# Patient Record
Sex: MALE | Race: OTHER | Employment: FULL TIME | URBAN - METROPOLITAN AREA
[De-identification: names, ages, dates, MRNs, and addresses within clinical notes are randomized per-mention and may not be internally consistent; named-entity substitution may affect disease eponyms.]

---

## 2021-08-14 ENCOUNTER — HOSPITAL ENCOUNTER (EMERGENCY)
Age: 56
Discharge: HOME | End: 2021-08-14
Attending: EMERGENCY MEDICINE
Payer: COMMERCIAL

## 2021-08-14 ENCOUNTER — APPOINTMENT (OUTPATIENT)
Dept: CT IMAGING | Age: 56
End: 2021-08-14
Attending: EMERGENCY MEDICINE
Payer: COMMERCIAL

## 2021-08-14 VITALS
DIASTOLIC BLOOD PRESSURE: 66 MMHG | HEIGHT: 70 IN | BODY MASS INDEX: 34.36 KG/M2 | SYSTOLIC BLOOD PRESSURE: 154 MMHG | HEART RATE: 67 BPM | RESPIRATION RATE: 16 BRPM | WEIGHT: 240 LBS | TEMPERATURE: 99 F | OXYGEN SATURATION: 97 %

## 2021-08-14 DIAGNOSIS — S06.0X0A CONCUSSION WITHOUT LOSS OF CONSCIOUSNESS, INITIAL ENCOUNTER: ICD-10-CM

## 2021-08-14 DIAGNOSIS — S16.1XXA STRAIN OF NECK MUSCLE, INITIAL ENCOUNTER: ICD-10-CM

## 2021-08-14 DIAGNOSIS — S09.90XA INJURY OF HEAD, INITIAL ENCOUNTER: Primary | ICD-10-CM

## 2021-08-14 LAB — GLUCOSE BLD-MCNC: 240 MG/DL (ref 70–99)

## 2021-08-14 PROCEDURE — 99284 EMERGENCY DEPT VISIT MOD MDM: CPT

## 2021-08-14 PROCEDURE — 70450 CT HEAD/BRAIN W/O DYE: CPT | Performed by: EMERGENCY MEDICINE

## 2021-08-14 PROCEDURE — 82962 GLUCOSE BLOOD TEST: CPT

## 2021-08-14 RX ORDER — ONDANSETRON 4 MG/1
4 TABLET, ORALLY DISINTEGRATING ORAL EVERY 4 HOURS PRN
Qty: 10 TABLET | Refills: 0 | Status: SHIPPED | OUTPATIENT
Start: 2021-08-14 | End: 2021-11-12 | Stop reason: CLARIF

## 2021-08-14 RX ORDER — ONDANSETRON 4 MG/1
4 TABLET, ORALLY DISINTEGRATING ORAL ONCE
Status: COMPLETED | OUTPATIENT
Start: 2021-08-14 | End: 2021-08-14

## 2021-08-14 RX ORDER — CYCLOBENZAPRINE HCL 10 MG
10 TABLET ORAL 3 TIMES DAILY PRN
Qty: 20 TABLET | Refills: 0 | Status: SHIPPED | OUTPATIENT
Start: 2021-08-14 | End: 2021-08-21

## 2021-08-14 RX ORDER — NAPROXEN 500 MG/1
500 TABLET ORAL 2 TIMES DAILY PRN
Qty: 20 TABLET | Refills: 0 | Status: SHIPPED | OUTPATIENT
Start: 2021-08-14 | End: 2021-08-21

## 2021-08-15 NOTE — ED PROVIDER NOTES
Patient Seen in: [facility] Emergency Department In [place]      History   Patient presents with:  Trauma    Stated Complaint: hit with golf ball    HPI/Subjective:   HPI    58-year-old male who was struck in the head with a golf ball at approximately 10 and are negative. Positive for stated complaint: hit with golf ball  Other systems are as noted in HPI. Constitutional and vital signs reviewed. All other systems reviewed and negative except as noted above.     Physical Exam     ED Triage Vitals other components within normal limits          CT BRAIN OR HEAD (79828)    Result Date: 8/14/2021  PROCEDURE:  CT BRAIN OR HEAD (26094)  COMPARISON:  None.   INDICATIONS:  hit with golf ball  TECHNIQUE:  Noncontrast CT scanning is performed through the brai concussion.   We discussed care and treatment and he was discharged in good condition                             Disposition and Plan     Clinical Impression:  Injury of head, initial encounter  (primary encounter diagnosis)  Concussion without loss of con

## 2021-08-17 ENCOUNTER — PATIENT OUTREACH (OUTPATIENT)
Dept: CASE MANAGEMENT | Age: 56
End: 2021-08-17

## 2021-08-17 NOTE — PROGRESS NOTES
Patient LVM requesting assistance scheduling apt    [address] [facility], [address] [place] [address] Rd  [phone]    Returned patients VM and he stated he has apt scheduled. Closing encounter.

## 2021-08-27 ENCOUNTER — OFFICE VISIT (OUTPATIENT)
Dept: NEUROLOGY | Facility: CLINIC | Age: 56
End: 2021-08-27
Payer: COMMERCIAL

## 2021-08-27 VITALS
RESPIRATION RATE: 16 BRPM | SYSTOLIC BLOOD PRESSURE: 132 MMHG | DIASTOLIC BLOOD PRESSURE: 70 MMHG | BODY MASS INDEX: 36 KG/M2 | HEART RATE: 74 BPM | WEIGHT: 249.19 LBS

## 2021-08-27 DIAGNOSIS — S09.90XA CLOSED HEAD INJURY, INITIAL ENCOUNTER: ICD-10-CM

## 2021-08-27 DIAGNOSIS — S16.1XXA STRAIN OF NECK MUSCLE, INITIAL ENCOUNTER: ICD-10-CM

## 2021-08-27 PROCEDURE — 99204 OFFICE O/P NEW MOD 45 MIN: CPT | Performed by: OTHER

## 2021-08-27 PROCEDURE — 3078F DIAST BP <80 MM HG: CPT | Performed by: OTHER

## 2021-08-27 PROCEDURE — 3075F SYST BP GE 130 - 139MM HG: CPT | Performed by: OTHER

## 2021-08-27 NOTE — PROGRESS NOTES
Patient reports was hit back of head with golf ball. No LOC. Patient reports no symptoms at this time.

## 2021-08-27 NOTE — PROGRESS NOTES
HPI:    Patient ID: Panda Brown is a 61year old male. PCP: Dr Aldo Rangel    HPI   Mr Pretty Lunsford is a 58-year-old male who presented for for evaluation of closed head injury.  He reports about 2 weeks ago he was playing golf and was struck in the head wi Never Used      Tobacco comment: once a week    Vaping Use      Vaping Use: Never used    Alcohol use: Yes      Comment: socially    Drug use: No       Review of Systems         Current Outpatient Medications   Medication Sig Dispense Refill   • metFORMIN Palate: intact  XI: Shoulder shrug: intact  XII: Tongue movement: normal    Motor Exam: Normal tone. Strength is  5 out of 5 in all extremities bilaterally.   DTR: 1+ in arms, 2+ patellar and 1+ achilles    Sensory: Sensory examination is normal to light to

## 2021-11-07 ENCOUNTER — ANESTHESIA EVENT (OUTPATIENT)
Dept: ENDOSCOPY | Facility: HOSPITAL | Age: 56
DRG: 432 | End: 2021-11-07
Payer: COMMERCIAL

## 2021-11-07 ENCOUNTER — HOSPITAL ENCOUNTER (INPATIENT)
Facility: HOSPITAL | Age: 56
LOS: 3 days | Discharge: HOME | DRG: 432 | End: 2021-11-10
Attending: EMERGENCY MEDICINE | Admitting: HOSPITALIST
Payer: COMMERCIAL

## 2021-11-07 ENCOUNTER — APPOINTMENT (OUTPATIENT)
Dept: CT IMAGING | Age: 56
DRG: 432 | End: 2021-11-07
Attending: EMERGENCY MEDICINE
Payer: COMMERCIAL

## 2021-11-07 ENCOUNTER — ANESTHESIA (OUTPATIENT)
Dept: ENDOSCOPY | Facility: HOSPITAL | Age: 56
DRG: 432 | End: 2021-11-07
Payer: COMMERCIAL

## 2021-11-07 DIAGNOSIS — K92.2 GASTROINTESTINAL HEMORRHAGE, UNSPECIFIED GASTROINTESTINAL HEMORRHAGE TYPE: Primary | ICD-10-CM

## 2021-11-07 PROCEDURE — 99223 1ST HOSP IP/OBS HIGH 75: CPT | Performed by: HOSPITALIST

## 2021-11-07 PROCEDURE — 74177 CT ABD & PELVIS W/CONTRAST: CPT | Performed by: EMERGENCY MEDICINE

## 2021-11-07 PROCEDURE — 06L38CZ OCCLUSION OF ESOPHAGEAL VEIN WITH EXTRALUMINAL DEVICE, VIA NATURAL OR ARTIFICIAL OPENING ENDOSCOPIC: ICD-10-PCS | Performed by: INTERNAL MEDICINE

## 2021-11-07 RX ORDER — ESOMEPRAZOLE MAGNESIUM 40 MG/1
40 CAPSULE, DELAYED RELEASE ORAL DAILY
COMMUNITY
Start: 2021-04-06

## 2021-11-07 RX ORDER — HYDROMORPHONE HYDROCHLORIDE 1 MG/ML
0.4 INJECTION, SOLUTION INTRAMUSCULAR; INTRAVENOUS; SUBCUTANEOUS EVERY 4 HOURS PRN
Status: DISCONTINUED | OUTPATIENT
Start: 2021-11-07 | End: 2021-11-10

## 2021-11-07 RX ORDER — HYDROMORPHONE HYDROCHLORIDE 1 MG/ML
0.2 INJECTION, SOLUTION INTRAMUSCULAR; INTRAVENOUS; SUBCUTANEOUS EVERY 4 HOURS PRN
Status: DISCONTINUED | OUTPATIENT
Start: 2021-11-07 | End: 2021-11-10

## 2021-11-07 RX ORDER — ONDANSETRON 2 MG/ML
4 INJECTION INTRAMUSCULAR; INTRAVENOUS EVERY 4 HOURS PRN
Status: DISCONTINUED | OUTPATIENT
Start: 2021-11-07 | End: 2021-11-07

## 2021-11-07 RX ORDER — ONDANSETRON 2 MG/ML
4 INJECTION INTRAMUSCULAR; INTRAVENOUS EVERY 6 HOURS PRN
Status: DISCONTINUED | OUTPATIENT
Start: 2021-11-07 | End: 2021-11-10

## 2021-11-07 RX ORDER — ACETAMINOPHEN AND CODEINE PHOSPHATE 300; 30 MG/1; MG/1
44198 TABLET ORAL EVERY 6 HOURS PRN
Status: ON HOLD | COMMUNITY
Start: 2021-03-29 | End: 2021-11-10

## 2021-11-07 RX ORDER — DEXTROSE MONOHYDRATE 25 G/50ML
50 INJECTION, SOLUTION INTRAVENOUS
Status: DISCONTINUED | OUTPATIENT
Start: 2021-11-07 | End: 2021-11-10

## 2021-11-07 RX ORDER — FUROSEMIDE 40 MG/1
60 TABLET ORAL DAILY
COMMUNITY
Start: 2021-09-20

## 2021-11-07 RX ORDER — SPIRONOLACTONE 100 MG/1
150 TABLET, FILM COATED ORAL DAILY
COMMUNITY
Start: 2021-09-20

## 2021-11-07 RX ORDER — LIDOCAINE HYDROCHLORIDE 10 MG/ML
INJECTION, SOLUTION EPIDURAL; INFILTRATION; INTRACAUDAL; PERINEURAL AS NEEDED
Status: DISCONTINUED | OUTPATIENT
Start: 2021-11-07 | End: 2021-11-07 | Stop reason: SURG

## 2021-11-07 RX ORDER — LORAZEPAM 2 MG/ML
1 INJECTION INTRAMUSCULAR ONCE
Status: COMPLETED | OUTPATIENT
Start: 2021-11-07 | End: 2021-11-07

## 2021-11-07 RX ORDER — SODIUM CHLORIDE 9 MG/ML
INJECTION, SOLUTION INTRAVENOUS CONTINUOUS
Status: DISCONTINUED | OUTPATIENT
Start: 2021-11-07 | End: 2021-11-07

## 2021-11-07 RX ORDER — KETAMINE HYDROCHLORIDE 50 MG/ML
INJECTION, SOLUTION, CONCENTRATE INTRAMUSCULAR; INTRAVENOUS AS NEEDED
Status: DISCONTINUED | OUTPATIENT
Start: 2021-11-07 | End: 2021-11-07 | Stop reason: SURG

## 2021-11-07 RX ORDER — PROCHLORPERAZINE EDISYLATE 5 MG/ML
5 INJECTION INTRAMUSCULAR; INTRAVENOUS EVERY 8 HOURS PRN
Status: DISCONTINUED | OUTPATIENT
Start: 2021-11-07 | End: 2021-11-10

## 2021-11-07 RX ORDER — ROSUVASTATIN CALCIUM 20 MG/1
20 TABLET, COATED ORAL DAILY
COMMUNITY
Start: 2021-09-08

## 2021-11-07 RX ORDER — SODIUM CHLORIDE 9 MG/ML
125 INJECTION, SOLUTION INTRAVENOUS CONTINUOUS
Status: DISCONTINUED | OUTPATIENT
Start: 2021-11-07 | End: 2021-11-08

## 2021-11-07 RX ORDER — NADOLOL 20 MG/1
20 TABLET ORAL DAILY
COMMUNITY
Start: 2021-06-26

## 2021-11-07 RX ADMIN — SODIUM CHLORIDE: 9 INJECTION, SOLUTION INTRAVENOUS at 10:13:00

## 2021-11-07 RX ADMIN — KETAMINE HYDROCHLORIDE 20 MG: 50 INJECTION, SOLUTION, CONCENTRATE INTRAMUSCULAR; INTRAVENOUS at 10:16:00

## 2021-11-07 RX ADMIN — LIDOCAINE HYDROCHLORIDE 50 MG: 10 INJECTION, SOLUTION EPIDURAL; INFILTRATION; INTRACAUDAL; PERINEURAL at 10:16:00

## 2021-11-07 NOTE — ED INITIAL ASSESSMENT (HPI)
PT to the ED for evaluation of LUQ abdominal pain and nausea. PT reports blood in his stool this morning and states he was recently diagnosed with cirrhosis.

## 2021-11-07 NOTE — H&P
[facility]PITAL[facility]  History and Physical     Dot Adarsh Patient Status:  Inpatient    1965 MRN [id number]   [facility] 4SW-A Attending Randall Aschoff, MD;Tailor*   Hosp Day # 0 PCP Noreen Carroll MD     Chief Complaint: GIB • TONSILLECTOMY     • UPPER GI ENDOSCOPY - REFERRAL  9/14/2010    GERD, ESOPHAGITIS, RPT 2 YRS   • UPPER GI ENDOSCOPY,EXAM         Social History:  reports that he has quit smoking. His smoking use included cigars.  He has never used smokeless tobacco. He Rfl: 0  lidocaine (XYLOCAINE) 1 % Injection Solution, Dispense 10cc Multidose Vial Use 0.1cc per Allergy Injection weekly (Patient not taking: Reported on 11/7/2021), Disp: 10 mL, Rfl: 0  AUVI-Q 0.3 MG/0.3ML Injection Solution Auto-injector, Inject 0.3 mL in the last 168 hours. Inflammatory Markers  Recent Labs   Lab 11/07/21  0632   COLEEN 122.8       Imaging: Imaging data reviewed in Epic.       ASSESSMENT / PLAN:     #Gastrointestinal bleed, presumed UGIB  #Anemia  -Admit  -NPO  -IVF  -PPI IV BID  -Octreo

## 2021-11-07 NOTE — CONSULTS
Critical Care H&P/Consult     NAME: Danielle De Los Santos - ROOM: [address] [place] [address] - MRN: [id number] - Age: 61year old - :  1965    Date of Admission: 2021  5:48 AM  Admission Diagnosis: Gastrointestinal hemorrhage, unspecified gastrointestinal hemorrh Laterality Date   • ADENOIDECTOMY     • CATARACT     • CHOLECYSTECTOMY     • COLONOSCOPY     • COLONOSCOPY & POLYPECTOMY  9/14/2010    COLON POLYP, RPT 5 YRS DR CAMACHO   • ERCP,DIAGNOSTIC     • EYE SURGERY     • REMOVAL GALLBLADDER     • SINUS SURGERY Q15 Min PRN   Or  glucose-vitamin C (DEX-4) chewable tab 8 tablet, 8 tablet, Oral, Q15 Min PRN  [START ON 11/8/2021] cefTRIAXone Sodium (ROCEPHIN) 2 g in sodium chloride 0.9% 100 mL IVPB-ADDV, 2 g, Intravenous, Q24H  ondansetron (ZOFRAN) injection 4 mg, 4 *   K 4.3      CO2 24.0     No results for input(s): TROP, CK in the last 168 hours. Imaging: I independently visualized all relevant chest imaging in PACS, agree with radiology interpretation except where noted.

## 2021-11-07 NOTE — OPERATIVE REPORT
Operative Report-Esophagogastroduodenoscopy      PREOPERATIVE DIAGNOSIS/INDICATION: Melena    POSTOPERTATIVE DIAGNOSIS: Esophageal varices    PROCEDURE PERFORMED: EGD    INFORMED CONSENT: Once a brief history and physical examinati now; can consider CLD in 12 hours     - Repeat EGD in 2-3 weeks for banding protocol    Domonique Bedolla MD  11/7/2021  10:32 AM

## 2021-11-07 NOTE — ANESTHESIA PREPROCEDURE EVALUATION
PRE-OP EVALUATION    Patient Name: Francisca Henriquez    Admit Diagnosis: Gastrointestinal hemorrhage, unspecified gastrointestinal hemorrhage type [K92.2]    Pre-op Diagnosis: melena    ESOPHAGOGASTRODUODENOSCOPY (EGD)    Anesthesia Procedure: [facility] Disp: , Rfl: , 11/6/2021 at Unknown time  Azelastine HCl 0.1 % Nasal Solution, 2 sprays by Nasal route 2 (two) times daily. , Disp: 30 mL, Rfl: 5, Past Month at Unknown time  metFORMIN HCl 500 MG Oral Tab, Take 1,000 mg by mouth 2 (two) times daily with clemente • CATARACT     • CHOLECYSTECTOMY     • COLONOSCOPY     • COLONOSCOPY & POLYPECTOMY  9/14/2010    COLON POLYP, RPT 5 YRS DR CAMACHO   • ERCP,DIAGNOSTIC     • EYE SURGERY     • REMOVAL GALLBLADDER     • SINUS SURGERY       • SINUS SURGERY PROC UNLISTED  5 adverse reactions to medications administered. The possibility of needing to convert to general anesthesia if necessary was discussed. Questions answered and patient wishes to proceed. Consent signed.   Plan/risks discussed with: patient                Pres

## 2021-11-07 NOTE — PROGRESS NOTES
Pharmacy Note:   P&T approved dose adjustment for: Rocephin    Meri Duggan has been prescribed Rocephin 1 g IV daily. Estimated Creatinine Clearance: 122 mL/min (A) (based on SCr of 0.69 mg/dL (L)). Body mass index is 33 kg/m².  Wt Readings fr

## 2021-11-07 NOTE — PLAN OF CARE
Pt received from [place] ED at 0800. Pt alert and oriented. Following commands. Pt reported pain. PRN dilaudid added and given. Pupils reactive. Received on room air. Pt afebrile. Hemodynamically stable. Normal sinus rhythm on telemetry rhythm.  Pt had E

## 2021-11-07 NOTE — ED QUICK NOTES
PT reports last PO intake was dinner at 1800 last night. States he attempted to have some ginger ale and saltines around 0100, but did not tolerate it d/t nausea.

## 2021-11-07 NOTE — CONSULTS
[facility]                       Gastroenterology [address] [facility] Gastroenterology    [facility] Patient Status:  Inpatient    1965 MRN [id number]   [facility] 4SW-A Attending Maggy Brizuela MD   [facility] • EYE SURGERY     • REMOVAL GALLBLADDER     • SINUS SURGERY       • SINUS SURGERY PROC UNLISTED  5/2010    baloon sinuplasty   • STRESS TEST  5/28/2009    NORMAL   • TONSILLECTOMY     • UPPER GI ENDOSCOPY - REFERRAL  9/14/2010    GERD, ESOPHAGITIS, RPT 2 reports no easy bruising, frequent gum bleeding or nose bleeding;   The patient has no history of known chronic anemia            Dermatologic: The patient reports no recent rashes or chronic skin disorders            Rheumatologic: The patient reports no h 11/07/2021     11/07/2021    CO2 24.0 11/07/2021     11/07/2021    CA 8.7 11/07/2021    ALB 2.9 11/07/2021    ALKPHO 244 11/07/2021    BILT 1.6 11/07/2021    AST 53 11/07/2021    ALT 59 11/07/2021    PTT 30.6 11/07/2021    INR 1.26 11/07/2021

## 2021-11-07 NOTE — ED PROVIDER NOTES
Patient Seen in: THE [facility] Emergency Department In [place]      History   Patient presents with:  GI Bleeding    Stated Complaint: blood in stool.      Subjective:   HPI    Patient was diagnosed with HORAN cirrhosis as well as esophageal varices just a few • UPPER GI ENDOSCOPY - REFERRAL  9/14/2010    GERD, ESOPHAGITIS, RPT 2 YRS                Social History    Tobacco Use      Smoking status: Former Smoker        Types: Cigars      Smokeless tobacco: Never Used      Tobacco comment: once a week    Vapi Creatinine 0.69 (*)     AST 53 (*)     Alkaline Phosphatase 244 (*)     Total Protein 8.3 (*)     Albumin 2.9 (*)     Globulin  5.4 (*)     A/G Ratio 0.5 (*)     All other components within normal limits   LIPASE - Abnormal; Notable for the following compo suspect variceal bleed or ulcer. He denies history of diverticulitis and no mention of this on previous CT imaging. Patient was treated IV fluid. Type and screen was sent. Protonix 80 mg IV ordered.   2 IVs established  Octreotide is not available at Roce[facility]in antibiotic and will attend to patient  I discussed case with intensivist, Dr. Julianne Levin.   His group will follow    Nurse supervisor was notified to expedite patient placement in the intensive care unit  Transport team was called  And family michele

## 2021-11-07 NOTE — ANESTHESIA POSTPROCEDURE EVALUATION
[address] [place] [address] Patient Status:  Inpatient   Age/Gender 61year old male MRN [id number]   Location [address] [place] [address] Attending Jayde Miller MD   Hosp Day # 0 PCP Kim Sutton MD       Anesthesia Post-op Note

## 2021-11-08 ENCOUNTER — APPOINTMENT (OUTPATIENT)
Dept: ULTRASOUND IMAGING | Facility: HOSPITAL | Age: 56
DRG: 432 | End: 2021-11-08
Attending: INTERNAL MEDICINE
Payer: COMMERCIAL

## 2021-11-08 PROCEDURE — 0W9G3ZZ DRAINAGE OF PERITONEAL CAVITY, PERCUTANEOUS APPROACH: ICD-10-PCS | Performed by: RADIOLOGY

## 2021-11-08 PROCEDURE — 49083 ABD PARACENTESIS W/IMAGING: CPT | Performed by: INTERNAL MEDICINE

## 2021-11-08 PROCEDURE — 99233 SBSQ HOSP IP/OBS HIGH 50: CPT | Performed by: HOSPITALIST

## 2021-11-08 RX ORDER — ALPRAZOLAM 0.5 MG/1
0.5 TABLET ORAL 3 TIMES DAILY PRN
Status: DISCONTINUED | OUTPATIENT
Start: 2021-11-08 | End: 2021-11-10

## 2021-11-08 RX ORDER — SODIUM CHLORIDE 9 MG/ML
INJECTION, SOLUTION INTRAVENOUS CONTINUOUS
Status: DISCONTINUED | OUTPATIENT
Start: 2021-11-08 | End: 2021-11-08

## 2021-11-08 NOTE — PLAN OF CARE
Assumed care of patient, c/o nausea, prn zofran given with improvement. Patient with reported increased anxiety, alprazolam restarted from home medications. Bedside paracentesis completed, 2L fluid removed. Abd US ordered, results pending.  Diet upgraded to

## 2021-11-08 NOTE — PROCEDURES
[facility]  Procedure Note    Jed Graver Patient Status:  Inpatient    1965 MRN [id number]   [facility] 4SW-A Attending Jaydon Mazariegos MD   Hosp Day # 1 PCP Korina Livingston MD     Procedure: US paracentesis    Pre-Proce

## 2021-11-08 NOTE — PAYOR COMM NOTE
--------------  ADMISSION REVIEW     Payor: Ginny Hernandez #:  [id number]  Authorization Number: [id number]    Admit date: 11/7/21  Admit time:  7:57 AM       REVIEW DOCUMENTATION:     ED Provider Notes        Patient Seen in: THE [facility] Emergency conjunctiva pink and moist.  Lids and lashes are normal.  Neck: Supple  Lungs: Clear to auscultation bilaterally. No rhonchi or rales. Heart: Normal S1 and S2, without murmur or rub. Distal pulses are strong and symmetric.   Abdomen: Quite protuberant bu Type and screen was sent. Protonix 80 mg IV ordered. 2 IVs established  Octreotide is not available at this emergency department    CBC shows normal white count. Hemoglobin is 10.1. Last hemoglobin in our system was 14.   Patient had a hemoglobin in Sep Clinical Impression:  Gastrointestinal hemorrhage, unspecified gastrointestinal hemorrhage type  (primary encounter diagnosis)     Disposition:  Admit  11/7/2021  7:03 am    Follow-up:  No follow-up provider specified.         Medications Prescribed:  C No rebound, guarding or organomegaly. Musculoskeletal: Moves all extremities. Extremities: No edema or cyanosis. Integument: No rashes or lesions. Psychiatric: Appropriate mood and affect.     Diagnostic Data:      Labs:  Recent Labs   Lab 11/07/21  06 Weight O2 Device O2 Flow Rate (L/min) [facility]    11/08/21 1200 98 °F (36.7 °C) 70 16 121/56 98 % — None (Room air) —     11/08/21 1100 — 72 18 122/63 99 % — — —     11/08/21 1000 — 72 19 128/60 95 % — — —     11/08/21 0900 — 65 19 118/58 98 % — — — MM 11/07/21 0723 — 91 16 152/67 97 % — None (Room air) — RS    11/07/21 9740 — — — — — — None (Room air) — TM    11/07/21 0601 — 88 16 144/66 99 % 230 lb None (Room air) — TM

## 2021-11-08 NOTE — PLAN OF CARE
Received last night at 1930H, alert and oriented x 4, on room air, denies any pain, wife at bedside. IVF ongoing for hydration, NPO status until 2330H last night, Octreotide drip ongoing at 50 mcg/hr.  Had 1 episode of nausea last night and given Zofran wi

## 2021-11-08 NOTE — PROGRESS NOTES
[facility] Gastroenterology     [address] [place] [address] [place] Patient Status:  Inpatient    1965 MRN [id number]   [facility] 4SW-A Attending Oumou Bowens MD   [facility] Day # 1 PCP Rick Prescott recorded    @[email or web address](      Imaging: Imaging data reviewed in Epic.     Medications:   • pantoprazole (PROTONIX) IV push  40 mg Intravenous Q12H   • cefTRIAXone  2 g Intravenous Q24H   • Insulin Aspart Pen  1-10 Units Subcutaneous TID AC and HS       All

## 2021-11-08 NOTE — PROGRESS NOTES
Sukhjinder Anderson Patient Status:  Inpatient    1965 MRN [id number]   [facility] 4SW-A Attending Isi Gaxiola MD   Hosp Day # 1 PCP Sarah Hylton MD     Critical Care Progress Note      Assessment/Plan:  1.  Acute blood loss a Recent Labs   Lab 11/07/21  0632 11/08/21  0508   * 156*   BUN 22* 18   CREATSERUM 0.69* 0.65*   GFRAA 122 125   GFRNAA 105 108   CA 8.7 8.2*   ALB 2.9* 2.6*   * 137   K 4.3 3.9    107   CO2 24.0 24.0   ALKPHO 244* 197*   AST 53* 58*

## 2021-11-08 NOTE — PROGRESS NOTES
[facility]     Hospitalist Progress Note     Radha Jesus Manuel Patient Status:  Inpatient    1965 MRN [id number]   [facility] 4SW-A Attending Beata Ackerman MD   Hosp Day # 1 PCP Meri King MD     Chief Complaint: GIB input(s): PCT in the last 168 hours. Cardiac  No results for input(s): TROP, PBNP in the last 168 hours. Creatinine Kinase  No results for input(s): CK in the last 168 hours.     Inflammatory Markers  Recent Labs   Lab 11/07/21  0632   COLEEN 122.8

## 2021-11-09 ENCOUNTER — APPOINTMENT (OUTPATIENT)
Dept: ULTRASOUND IMAGING | Facility: HOSPITAL | Age: 56
DRG: 432 | End: 2021-11-09
Attending: STUDENT IN AN ORGANIZED HEALTH CARE EDUCATION/TRAINING PROGRAM
Payer: COMMERCIAL

## 2021-11-09 PROCEDURE — 99231 SBSQ HOSP IP/OBS SF/LOW 25: CPT | Performed by: HOSPITALIST

## 2021-11-09 PROCEDURE — 76700 US EXAM ABDOM COMPLETE: CPT | Performed by: STUDENT IN AN ORGANIZED HEALTH CARE EDUCATION/TRAINING PROGRAM

## 2021-11-09 RX ORDER — ACETAMINOPHEN 325 MG/1
650 TABLET ORAL EVERY 6 HOURS PRN
Status: DISCONTINUED | OUTPATIENT
Start: 2021-11-09 | End: 2021-11-10

## 2021-11-09 NOTE — PLAN OF CARE
Pt is aaox4, denies pain, n/v, BRBPR, octreotide drip infusing, tolerating full liquid diet per pt.    Problem: Diabetes/Glucose Control  Goal: Glucose maintained within prescribed range  Description: INTERVENTIONS:  - Monitor Blood Glucose as ordered  - As low platelets)  INTERVENTIONS:  - Avoid intramuscular injections, enemas and rectal medication administration  - Ensure safe mobilization of patient  - Hold pressure on venipuncture sites to achieve adequate hemostasis  - Assess for signs and symptoms of i

## 2021-11-09 NOTE — PROGRESS NOTES
[facility]     Hospitalist Progress Note     Sukhjinder Anderson Patient Status:  Inpatient    1965 MRN [id number]   [facility] 4SW-A Attending Isi Gaxiola MD   Hosp Day # 2 PCP Sarah Hylton MD     Chief Complaint: GIB Lab 11/07/21  0632 11/08/21  0508   PTP 15.8* 16.2*   INR 1.26* 1.27*            COVID-19 Lab Results    COVID-19  Lab Results   Component Value Date    COVID19 Not Detected 11/07/2021       Pro-Calcitonin  No results for input(s): PCT in the last 168 ho

## 2021-11-09 NOTE — PAYOR COMM NOTE
--------------  ADMISSION REVIEW     Payor: Eddie Davis #:  [id number]  Authorization Number: [id number]    Admit date: 11/7/21  Admit time:  7:57 AM       REVIEW DOCUMENTATION:     ED Provider Notes      ED Provider Notes signed by Geovany Rincon GOUT    • Head injury 08/14/2021   • Obesity, unspecified    • Other and unspecified hyperlipidemia    • Pure hyperglyceridemia    • Unspecified sleep apnea               Past Surgical History:   Procedure Laterality Date   • CHOLECYSTECTOMY     • [address] without involuntary guarding, rigidity, or rebound. Extremities: Unremarkable. Calves nonswollen, symmetric, nontender. No pedal edema. Skin: Unremarkable. No skin change or skin rash. Neurologic:  Mental status as above.   Patient moves all extremi (Blood NIFB)[[id number]]                               Final result               Antibody QEYGIF[[id number]]                                  Final result                 Please view results for these tests on the individual orders.    ABORH (BLOOD TYPE)   A colon consistent with diarrhea status.  Ascites all 4 quadrants small-moderate degree.  Nonspecific stranding of the abdominal fat could reflect metastatic infiltration or inflammation.  No free air identified.  No abscess   identified.  Thickened appearan Physician    Filed: 11/7/2021  4:49 PM Date of Service: 11/7/2021  8:27 AM Status: Addendum    : Franklin Pacheco MD (Physician)    Related Notes: Original Note by Franklin Pacheco MD (Physician) filed at 11/7/2021  4:47 PM           [facility]  [facility]    COLON POLYP, RPT 5 YRS DR CAMACHO   • ERCP,DIAGNOSTIC     • EYE SURGERY     • REMOVAL GALLBLADDER     • SINUS SURGERY       • SINUS SURGERY PROC UNLISTED  5/2010    baloon sinuplasty   • STRESS TEST  5/28/2009    NORMAL   • TONSILL total) as directed one time for 1 dose., Disp: 1 each, Rfl: 0  ondansetron 4 MG Oral Tablet Dispersible, Take 1 tablet (4 mg total) by mouth every 4 (four) hours as needed for Nausea.  (Patient not taking: Reported on 11/7/2021), Disp: 10 tablet, Rfl: 0  li Component Value Date    COVID19 Not Detected 11/07/2021       Pro-Calcitonin  No results for input(s): PCT in the last 168 hours. Cardiac  No results for input(s): TROP, PBNP in the last 168 hours.     Creatinine Kinase  No results for input(s): CK in 11/9/2021 2610 New Bag 50 mcg/hr Intravenous Sung Grullon    11/8/2021 2052 New Bag 50 mcg/hr Intravenous Distor, Lazarus Barnes, RN      ondansetron [facility]) injection 4 mg     Date Action Dose Route User    11/9/2021 0402 Given 4 mg Intravenous Yadi C 11/08/21 0000 98.4 °F (36.9 °C) 78 18 122/52 94 % — None (Room air) — MG        CIWA Scores (since admission)     None        11/7 GI CONSULT NOTE     Reason for consultation: melena     HPI:      54-year-old male with a history of Guerrero versus EtOH cirr accuchecks and SSI  5. Hyponatremia - free water restriction, monitor  6. FEN - NPO  7. Ppx - SCD only  8.  Dispo - full code  - ICU, at risk for decompensation      History of Present Illness:   Jordon Zheng is a 61year old current \"social\" smoke 67  Resp:  [12-24] 19  BP: (108-135)/(46-79) 130/79  Patient Weight for the past 72 hrs:    Weight   11/07/21 0601 230 lb (104.3 kg)      Recent Labs   Lab 11/07/21  0632 11/07/21  1231 11/07/21  1839 11/08/21  0508   WBC 9.2  --   --  7.2   HGB 10.1* 9.5* bleeding  2. GIB  - s/p EGD with banding of varicies  - IV PPI BID  - octreotide gtt  - GI following  - Con't to trend hgb  3. HORAN cirrhosis  - ceftriaxone ppx  - per GI  - Paracentesis ordered   4. DM  - accuchecks and SSI  5.  Hyponatremia - free water r cirrhosis  Will need to resume NSBB as OP for 2ndary ppx     HCC screen.  RUQ US; AFP wnl     PSE: no hx of PSE per patient, no asterixis on exam     Nutrition - full liquid diet     OLTx- OP f/u for liver transplant evaluation

## 2021-11-09 NOTE — PLAN OF CARE
Patient alert and oriented x4. Vital signs stable. Room air. No SOB. Complained of pain, PRN pain medication given with relief. No GI Bleeding noted. Hgb monitored q12hrs, Hgb stable at this time. Maintained on Octreotide gtt.  NPO post midnight in preparat staff  - Avoid use of toothpicks and dental floss  - Use electric shaver for shaving  - Use soft bristle tooth brush  - Limit straining and forceful nose blowing  11/9/2021 0304 by Christy Jimenez RN  Outcome: Progressing  11/9/2021 0303 by Jossue Grullon

## 2021-11-09 NOTE — PROGRESS NOTES
[address] [facility] Gastroenterology     [address] [place] [address] [place] Patient Status:  Inpatient    1965 MRN [id number]   [facility] 4SW-A Attending Robyn Gooden MD   [facility] Day # 2 PCP ALEJANDRO Doty 3.9 4.0    107 105   CO2 24.0 24.0 27.0   ALKPHO 244* 197* 202*   AST 53* 58* 69*   ALT 59 58 59   BILT 1.6 1.0 1.2   TP 8.3* 7.8 7.5       Recent Labs   Lab 11/07/21  0632 11/08/21  0508   PTP 15.8* 16.2*   INR 1.26* 1.27*            No data recorde questions. GI will follow.    If Hg stable, and patient tolerating PO intake without any overt GI bleeding, anticipate discharge potentially tomorrow     Greta Mhaaraj MD  [facility] Gastroenterology

## 2021-11-09 NOTE — PROGRESS NOTES
Pt received from ICU via wheelchair, accompanied by family, pt is aaox4, denies pain, octreotide 10ml/h via [address] [place] [address].

## 2021-11-10 VITALS
WEIGHT: 230 LBS | TEMPERATURE: 99 F | OXYGEN SATURATION: 98 % | BODY MASS INDEX: 32.93 KG/M2 | HEART RATE: 64 BPM | DIASTOLIC BLOOD PRESSURE: 56 MMHG | HEIGHT: 70 IN | RESPIRATION RATE: 18 BRPM | SYSTOLIC BLOOD PRESSURE: 121 MMHG

## 2021-11-10 PROCEDURE — 99239 HOSP IP/OBS DSCHRG MGMT >30: CPT | Performed by: HOSPITALIST

## 2021-11-10 RX ORDER — ONDANSETRON 4 MG/1
4 TABLET, ORALLY DISINTEGRATING ORAL EVERY 4 HOURS PRN
Qty: 20 TABLET | Refills: 0 | Status: SHIPPED | OUTPATIENT
Start: 2021-11-10

## 2021-11-10 RX ORDER — ACETAMINOPHEN AND CODEINE PHOSPHATE 300; 30 MG/1; MG/1
1 TABLET ORAL EVERY 6 HOURS PRN
Qty: 10 TABLET | Refills: 0 | Status: SHIPPED | COMMUNITY
Start: 2021-11-10 | End: 2021-11-12 | Stop reason: CLARIF

## 2021-11-10 RX ORDER — CEFDINIR 300 MG/1
300 CAPSULE ORAL 2 TIMES DAILY
Qty: 2 CAPSULE | Refills: 0 | Status: SHIPPED | OUTPATIENT
Start: 2021-11-11 | End: 2021-11-12 | Stop reason: CLARIF

## 2021-11-10 NOTE — PLAN OF CARE
Patient alert and oriented x4. Afebrile. Room air. No SOB. No complaints of abdominal pain. No nausea and vomiting. No GI Bleeding observed. Octreotide gtt on-going. Tolerating Soft diet so far. Blood Glucose monitored.   Ambulates along the hallways with INTERVENTIONS:  - Maintain adequate hydration with IV or PO as ordered and tolerated  - Nasogastric tube to low intermittent suction as ordered  - Evaluate effectiveness of ordered antiemetic medications  - Provide nonpharmacologic comfort measures as appr

## 2021-11-10 NOTE — PLAN OF CARE
Pt. A&O x4. VSS. Afebrile. No c/o abdominal pain. Tolerating diet. Hgb stable today. Pt. Able to ambulate in the hallways independently. Pt. Hopeful to discharge later today. Call light within reach. Will continue to monitor. 1600: Pt.  Cleared for disch vomiting  Description: INTERVENTIONS:  - Maintain adequate hydration with IV or PO as ordered and tolerated  - Nasogastric tube to low intermittent suction as ordered  - Evaluate effectiveness of ordered antiemetic medications  - Provide nonpharmacologic c

## 2021-11-10 NOTE — PROGRESS NOTES
[address] [facility] Gastroenterology     [address] [place] [address] Patient Status:  Inpatient    1965 MRN [id number]   [facility] 4SW-A Attending Luz Nair MD   [facility] Day # 3 PCP Arti Sky CO2 24.0 27.0 27.0   ALKPHO 197* 202* 207*   AST 58* 69* 53*   ALT 58 59 54   BILT 1.0 1.2 0.8   TP 7.8 7.5 7.3       Recent Labs   Lab 11/07/21  0632 11/08/21  0508   PTP 15.8* 16.2*   INR 1.26* 1.27*            No data recorded    @[facility](      Im Please page with any questions.    F/u in GI clinic in 2-4 weeks  F/u with EGD at [address] [facility] in 3 weeks, banding protocol    Adry Fabian MD  [facility] Gastroenterology

## 2021-11-11 NOTE — DISCHARGE SUMMARY
[facility] HOSPITALIST  DISCHARGE SUMMARY     Violeta Garg Patient Status:  Inpatient    1965 MRN [id number]   [facility] 4NW-A Attending No att. providers found   Hosp Day # 3 PCP Olivia Basurto MD     Date of Admission:  pain.    Brief Synopsis: pt w/ GI bleed. Underwent EGD and found to have varicies. Underwent banding. Bleeding resolved. Did undergo paracentesis while here. hgb stable. Completed course of octreotide gtt. Ok to DC home and f/u GI as outpt.      Procedures Tab  Refills: 0     Auvi-Q 0.3 MG/0.3ML Soaj  Generic drug: EPINEPHrine      Inject 0.3 mL (1 each total) as directed as needed.    Quantity: 1 each  Refills: 0     EPINEPHrine 0.3 MG/0.3ML Soaj  Commonly known as: EpiPen 2-Santi      Inject 0.3 mL (1 each to an appointment as soon as possible for a visit      Fawn Bernard, [phone]    Schedule an appointment as soon as possible for a visit in 2 weeks  follow-up      Vital signs:  Temp:  [98.2 °F (36.8 °C)-98.

## 2021-11-11 NOTE — PAYOR COMM NOTE
--------------  DISCHARGE REVIEW    Payor: Memo Valdez #:  [id number]  Authorization Number: [id number]    Admit date: 11/7/21  Admit time:   7:57 AM  Discharge Date: 11/10/2021  4:48 PM     Admitting Physician: Haily Dwyer MD  Attending history of alcoholic cirrhosis, GERD, diabetes mellitus, YONI presents with dark stools. Patient states he was diagnosed with alcoholic cirrhosis and [place] [facility]  He has been on diuretics and has been watching for signs of decompensated cirrhosi each.      Take 1 tablet (4 mg total) by mouth every 4 (four) hours as needed for Nausea. Quantity: 10 tablet  Refills: 0     ondansetron 4 MG Tbdp  Commonly known as: ZOFRAN-ODT  What changed:  You were already taking a medication with the same name, and Tabs  Generic drug: cetirizine      1 TABLET DAILY AT BEDTIME   Refills: 0     ZyrTEC-D Allergy & Congestion 5-120 MG Tb12  Generic drug: cetirizine-pseudoephedrine      1 TABLET EVERY MORNING   Refills: 0           Where to Get Your Medications      These

## 2021-11-12 ENCOUNTER — HOSPITAL ENCOUNTER (OUTPATIENT)
Facility: HOSPITAL | Age: 56
Setting detail: OBSERVATION
Discharge: HOME | End: 2021-11-14
Attending: EMERGENCY MEDICINE | Admitting: HOSPITALIST
Payer: COMMERCIAL

## 2021-11-12 DIAGNOSIS — K92.2 GASTROINTESTINAL HEMORRHAGE, UNSPECIFIED GASTROINTESTINAL HEMORRHAGE TYPE: Primary | ICD-10-CM

## 2021-11-12 PROBLEM — D64.9 ANEMIA: Status: ACTIVE | Noted: 2021-11-12

## 2021-11-12 PROBLEM — R73.9 HYPERGLYCEMIA: Status: ACTIVE | Noted: 2021-11-12

## 2021-11-12 PROBLEM — E87.1 HYPONATREMIA: Status: ACTIVE | Noted: 2021-11-12

## 2021-11-12 PROCEDURE — 99220 INITIAL OBSERVATION CARE,LEVL III: CPT | Performed by: STUDENT IN AN ORGANIZED HEALTH CARE EDUCATION/TRAINING PROGRAM

## 2021-11-12 RX ORDER — PROCHLORPERAZINE EDISYLATE 5 MG/ML
5 INJECTION INTRAMUSCULAR; INTRAVENOUS EVERY 8 HOURS PRN
Status: DISCONTINUED | OUTPATIENT
Start: 2021-11-12 | End: 2021-11-14

## 2021-11-12 RX ORDER — SPIRONOLACTONE 25 MG/1
100 TABLET ORAL DAILY
Status: DISCONTINUED | OUTPATIENT
Start: 2021-11-13 | End: 2021-11-14

## 2021-11-12 RX ORDER — ALPRAZOLAM 0.5 MG/1
0.5 TABLET ORAL 3 TIMES DAILY PRN
COMMUNITY
Start: 2012-01-24

## 2021-11-12 RX ORDER — ONDANSETRON 2 MG/ML
4 INJECTION INTRAMUSCULAR; INTRAVENOUS EVERY 4 HOURS PRN
Status: DISCONTINUED | OUTPATIENT
Start: 2021-11-12 | End: 2021-11-12

## 2021-11-12 RX ORDER — CETIRIZINE HYDROCHLORIDE 10 MG/1
10 TABLET ORAL NIGHTLY
Status: DISCONTINUED | OUTPATIENT
Start: 2021-11-13 | End: 2021-11-14

## 2021-11-12 RX ORDER — ONDANSETRON 2 MG/ML
4 INJECTION INTRAMUSCULAR; INTRAVENOUS EVERY 6 HOURS PRN
Status: DISCONTINUED | OUTPATIENT
Start: 2021-11-12 | End: 2021-11-14

## 2021-11-12 RX ORDER — SODIUM CHLORIDE 9 MG/ML
1000 INJECTION, SOLUTION INTRAVENOUS ONCE
Status: COMPLETED | OUTPATIENT
Start: 2021-11-12 | End: 2021-11-14

## 2021-11-12 RX ORDER — ACETAMINOPHEN 325 MG/1
650 TABLET ORAL EVERY 6 HOURS PRN
Status: DISCONTINUED | OUTPATIENT
Start: 2021-11-12 | End: 2021-11-14

## 2021-11-12 RX ORDER — NADOLOL 20 MG/1
20 TABLET ORAL DAILY
Status: DISCONTINUED | OUTPATIENT
Start: 2021-11-12 | End: 2021-11-14

## 2021-11-12 RX ORDER — SODIUM CHLORIDE 9 MG/ML
INJECTION, SOLUTION INTRAVENOUS CONTINUOUS
Status: ACTIVE | OUTPATIENT
Start: 2021-11-12 | End: 2021-11-13

## 2021-11-12 RX ORDER — FUROSEMIDE 40 MG/1
40 TABLET ORAL DAILY
Status: DISCONTINUED | OUTPATIENT
Start: 2021-11-13 | End: 2021-11-14

## 2021-11-12 RX ORDER — PSEUDOEPHEDRINE HCL 60 MG/1
60 TABLET ORAL EVERY 6 HOURS PRN
COMMUNITY

## 2021-11-12 NOTE — ED PROVIDER NOTES
Patient Seen in: [facility] Emergency Department      History   Patient presents with:  GI Bleeding    Stated Complaint:     Subjective:   HPI    The patient had a recent EGD done. The patient states he had his EDG done emergently on Sunday.   He ha use: Not Currently      Comment: socially    Drug use: No             Review of Systems    Positive for stated complaint:   Other systems are as noted in HPI. Constitutional and vital signs reviewed.       All other systems reviewed and negative except as normal limits   CBC W/ DIFFERENTIAL - Abnormal; Notable for the following components:    RBC 3.19 (*)     HGB 9.5 (*)     HCT 27.6 (*)     All other components within normal limits   RAPID SARS-COV-2 BY PCR - Normal   CBC WITH DIFFERENTIAL WITH PLATELET karyotype. Keep patient n.p.o. Patient will be admitted patient was typed and screened.                      Disposition and Plan     Clinical Impression:  Gastrointestinal hemorrhage, unspecified gastrointestinal hemorrhage type  (primary encounter diagn

## 2021-11-12 NOTE — ED INITIAL ASSESSMENT (HPI)
Patient had recent EGD done and banding for esophageal varices.  Today noted dark blood in his stool an hour prior to arrival.

## 2021-11-13 ENCOUNTER — ANESTHESIA (OUTPATIENT)
Dept: ENDOSCOPY | Facility: HOSPITAL | Age: 56
End: 2021-11-13
Payer: COMMERCIAL

## 2021-11-13 ENCOUNTER — ANESTHESIA EVENT (OUTPATIENT)
Dept: ENDOSCOPY | Facility: HOSPITAL | Age: 56
End: 2021-11-13
Payer: COMMERCIAL

## 2021-11-13 PROCEDURE — 0W3P8ZZ CONTROL BLEEDING IN GASTROINTESTINAL TRACT, VIA NATURAL OR ARTIFICIAL OPENING ENDOSCOPIC: ICD-10-PCS | Performed by: INTERNAL MEDICINE

## 2021-11-13 PROCEDURE — 99225 SUBSEQUENT OBSERVATION CARE: CPT | Performed by: HOSPITALIST

## 2021-11-13 RX ORDER — LIDOCAINE HYDROCHLORIDE 10 MG/ML
INJECTION, SOLUTION EPIDURAL; INFILTRATION; INTRACAUDAL; PERINEURAL AS NEEDED
Status: DISCONTINUED | OUTPATIENT
Start: 2021-11-13 | End: 2021-11-13 | Stop reason: SURG

## 2021-11-13 RX ORDER — DEXTROSE MONOHYDRATE 25 G/50ML
50 INJECTION, SOLUTION INTRAVENOUS
Status: DISCONTINUED | OUTPATIENT
Start: 2021-11-13 | End: 2021-11-14

## 2021-11-13 RX ORDER — SODIUM CHLORIDE 9 MG/ML
INJECTION, SOLUTION INTRAVENOUS CONTINUOUS PRN
Status: DISCONTINUED | OUTPATIENT
Start: 2021-11-13 | End: 2021-11-13 | Stop reason: SURG

## 2021-11-13 RX ADMIN — SODIUM CHLORIDE: 9 INJECTION, SOLUTION INTRAVENOUS at 10:05:00

## 2021-11-13 RX ADMIN — LIDOCAINE HYDROCHLORIDE 50 MG: 10 INJECTION, SOLUTION EPIDURAL; INFILTRATION; INTRACAUDAL; PERINEURAL at 10:08:00

## 2021-11-13 NOTE — PROGRESS NOTES
[facility]     Hospitalist Progress Note     Anna Ingridmelissa Patient Status:  Observation    1965 MRN [id number]   [facility] 4NW-A Attending Debby Fitzgerald MD   Hosp Day # 0 PCP Noreen Carroll MD     Chief Complaint: Megena 11/07/21  0632 11/08/21  0508 11/12/21  1623   PTP 15.8* 16.2* 15.5*   INR 1.26* 1.27* 1.20*            COVID-19 Lab Results    COVID-19  Lab Results   Component Value Date    COVID19 Not Detected 11/12/2021    COVID19 Not Detected 11/07/2021       Pro-Yao

## 2021-11-13 NOTE — ED QUICK NOTES
Orders for admission, patient is aware of plan and ready to go upstairs. Any questions, please call ED RN Briana Covington  at extension [phone]    Vaccinated? yes  Type of COVID test sent: rapid  COVID Suspicion level: Low      Titratable drug(s) infusing:  Rate: n/a

## 2021-11-13 NOTE — OPERATIVE REPORT
Operative Report-Esophagogastroduodenoscopy with variceal banding      PREOPERATIVE DIAGNOSIS/INDICATION:   1. Hematochezia  2. History of recent variceal bleed  3.  Cirrhosis  POSTOPERTATIVE DIAGNOSIS:  1. Grade IV esophageal varic gastropathy  - DUODENUM: Normal to the extent examined  THERAPEUTICS: Banding x 2  RECOMMENDATIONS:   - Post EGD precautions, watch for bleeding, infection, perforation, adverse drug reactions   - Repeat banding in 2- 3 weeks with primary GI  - Continue Oc

## 2021-11-13 NOTE — ED QUICK NOTES
Pt endorsed to [employer].  Pt aware of his admission and verbalizing understanding

## 2021-11-13 NOTE — H&P
[facility]PITAL[facility]  History and Physical     Kash Carlos Patient Status:  Emergency    1965 MRN [id number]   Location [address] Attending Jose Arevalo MD   Hosp Day # 0 PCP Itzel Kiser MD     Chief Comp Disease Father    • Breast Cancer Mother    • Diabetes Mother     reviewed    Allergies:   Vicodin [Hydrocodon*    ANXIETY    Medications:  No current facility-administered medications on file prior to encounter.   ALPRAZolam 0.5 MG Oral Tab, Take 0.5 mg by deformity. Abdomen: Soft, nontender, nondistended. Positive bowel sounds. No rebound, guarding or organomegaly. Neurologic: No focal neurological deficits. CNII-XII grossly intact. Musculoskeletal: Moves all extremities.   Extremities: No edema or cyano / PLAN:     1. Tarry stools in pt with recent EV banding   a. Monitor Hb  b. GI on Cs  c. PPI  d. OCtreotide gtt  2. Liver cirrhosis withEV s/p recent banding   3. Obesity   4. Anemia - stable   a. Monitor H-H  5. Known lymphadenopathy, splenomegaly   6.  G

## 2021-11-13 NOTE — ANESTHESIA PREPROCEDURE EVALUATION
PRE-OP EVALUATION    Patient Name: Yara Cano    Admit Diagnosis: Gastrointestinal hemorrhage, unspecified gastrointestinal hemorrhage type [K92.2]    Pre-op Diagnosis: GI bleed [K92.2]    ESOPHAGOGASTRODUODENOSCOPY (EGD)    Anesthesia Procedure: 0900  Esomeprazole Magnesium 40 MG Oral Capsule Delayed Release, Take 40 mg by mouth daily. , Disp: , Rfl: , 11/12/2021 at 0900  furosemide 40 MG Oral Tab, Take 40 mg by mouth daily. , Disp: , Rfl: , 11/12/2021 at 0900  nadolol 20 MG Oral Tab, Take 20 mg by DR CAMACHO   • ERCP,DIAGNOSTIC     • EYE SURGERY     • REMOVAL GALLBLADDER     • SINUS SURGERY       • SINUS SURGERY PROC UNLISTED  5/2010    baloon sinuplasty   • STRESS TEST  5/28/2009    NORMAL   • TONSILLECTOMY     • UPPER GI ENDOSCOPY - REFERRAL  9/14/

## 2021-11-13 NOTE — ANESTHESIA POSTPROCEDURE EVALUATION
[address] [facility] Patient Status:  Observation   Age/Gender 61year old male MRN [id number]   Location [id number] [place] [address] Attending Galen Edge MD   Hosp Day # 0 PCP Kina Santizo MD       Anesthesia Post-op Not

## 2021-11-13 NOTE — PLAN OF CARE
Assumed care @ 0730/ Patient c/o nausea and headache  this morning. Patient had EGD this morning, procedure tolerated well. Full liquid diet tolerated well. Zofran given as needed, with good relief. Patient had 1 dark brown soft stool this afternoon.  Dario

## 2021-11-13 NOTE — CONSULTS
[facility]                       Gastroenterology [address] [facility] Gastroenterology    Jackeline Lima Patient Status:  Observation    1965 MRN [id number]   [facility] 4NW-A Attending Estrella Mckeon MD   H ESOPHAGITIS, RPT 2 YRS   • UPPER GI ENDOSCOPY,EXAM       Social History    Tobacco Use      Smoking status: Former Smoker        Types: Cigars      Smokeless tobacco: Never Used      Tobacco comment: once a week, quit 3 months ago    Vaping Use      Vaping Respiratory: No shortness of breath, asthma, copd, recurrent pneumonia            Hematologic: The patient reports no easy bruising, frequent gum bleeding or nose bleeding;   The patient has no history of known chronic anemia            Dermatologic: The pa 11/12/2021    .0 11/12/2021    CREATSERUM 0.82 11/12/2021    BUN 12 11/12/2021     11/12/2021    K 3.9 11/12/2021     11/12/2021    CO2 27.0 11/12/2021     11/12/2021    CA 8.5 11/12/2021    ALB 2.8 11/12/2021    ALKPHO 276 11/12/ 13.1 cm. Left kidney measures 14.2 cm. AORTA/IVC:  Small ascites in the right upper quadrant and right lower quadrant. .                   Impression: CONCLUSION:  Findings of portal hypertension, with cirrhosis, splenomegaly and small ascites.   No biliar

## 2021-11-13 NOTE — PROGRESS NOTES
NURSING ADMISSION NOTE      Patient admitted via cart. Axox4. Wife at bedside. Navigator complete. Oriented to room. Safety precautions initiated. Ocretide drip infusing. NPO at midnight. Ambulatory. Tylenol given for headache with good relief.  Bed in

## 2021-11-14 VITALS
HEART RATE: 52 BPM | TEMPERATURE: 98 F | HEIGHT: 70 IN | BODY MASS INDEX: 32.5 KG/M2 | DIASTOLIC BLOOD PRESSURE: 58 MMHG | WEIGHT: 227 LBS | SYSTOLIC BLOOD PRESSURE: 122 MMHG | OXYGEN SATURATION: 97 % | RESPIRATION RATE: 21 BRPM

## 2021-11-14 PROCEDURE — 99217 OBSERVATION CARE DISCHARGE: CPT | Performed by: HOSPITALIST

## 2021-11-14 NOTE — PROGRESS NOTES
[facility]     Hospitalist Progress Note     Danielle De Los Santos Patient Status:  Observation    1965 MRN [id number]   [facility] 4NW-A Attending Chintan Camilo MD   Hosp Day # 0 PCP Eagle Ignacio MD     Chief Complaint: Megena 1.20*            COVID-19 Lab Results    COVID-19  Lab Results   Component Value Date    COVID19 Not Detected 11/12/2021    COVID19 Not Detected 11/07/2021       Pro-Calcitonin  No results for input(s): PCT in the last 168 hours.     Cardiac  No results for Quality 110: Preventive Care And Screening: Influenza Immunization: Influenza Immunization Administered during Influenza season Quality 130: Documentation Of Current Medications In The Medical Record: Current Medications Documented Quality 131: Pain Assessment And Follow-Up: No documentation of pain assessment, reason not given Quality 111:Pneumonia Vaccination Status For Older Adults: Pneumococcal Vaccination Previously Received Detail Level: Detailed Home

## 2021-11-14 NOTE — PROGRESS NOTES
[facility]                       Gastroenterology Follow up [address] [facility] Gastroenterology    Sukhjinder Anderson Patient Status:  Observation    1965 MRN [id number]   [facility] 4NW-A Attending MD Dayna Garsia 07/26/2011 09:32 AM 0.5 0.0 - 1.2 mg/dL Final     ALT (SGPT)   Date/Time Value Ref Range Status   07/26/2011 09:32 AM 63 (H) 0 - 55 IU/L Final     ALT   Date/Time Value Ref Range Status   11/13/2021 08:30 AM 50 16 - 61 U/L Final   11/18/2011 12:44 PM 81

## 2021-11-14 NOTE — PROGRESS NOTES
Pt A&Ox4. Denies pain. Up walking thomas with wife. Miguel Manjarrez for discharge. No bowel movement overnight. No c/o nausea. Needs met.

## 2021-11-14 NOTE — PLAN OF CARE
Patient denies nausea, denies abdominal pain. Patient's abdomen distended, soft, non-tender. No bowel movement this shift. Patient's vital signs stable, 1215- Patient had medium soft brown stool.

## 2021-11-15 NOTE — DISCHARGE SUMMARY
[facility] HOSPITALIST  DISCHARGE SUMMARY     Jed Meneses Patient Status:  Observation    1965 MRN [id number]   [facility] 4NW-A Attending No att. providers found   Hosp Day # 0 PCP Korina Livingston MD     Date of Admission:  0     nadolol 20 MG Tabs  Commonly known as: CORGARD      Take 20 mg by mouth daily. Refills: 0     ondansetron 4 MG Tbdp  Commonly known as: ZOFRAN-ODT      Take 1 tablet (4 mg total) by mouth every 4 (four) hours as needed for Nausea.    Quantity: 20 ta

## 2021-12-30 ENCOUNTER — HOSPITAL ENCOUNTER (INPATIENT)
Facility: HOSPITAL | Age: 56
LOS: 3 days | Discharge: HOME | DRG: 377 | End: 2022-01-02
Attending: EMERGENCY MEDICINE | Admitting: HOSPITALIST
Payer: COMMERCIAL

## 2021-12-30 DIAGNOSIS — D64.9 ANEMIA, UNSPECIFIED TYPE: ICD-10-CM

## 2021-12-30 DIAGNOSIS — R79.1 ABNORMAL INR: ICD-10-CM

## 2021-12-30 DIAGNOSIS — D69.6 THROMBOCYTOPENIA (HCC): ICD-10-CM

## 2021-12-30 DIAGNOSIS — K74.69 OTHER CIRRHOSIS OF LIVER (HCC): ICD-10-CM

## 2021-12-30 DIAGNOSIS — K92.0 HEMATEMESIS WITHOUT NAUSEA: Primary | ICD-10-CM

## 2021-12-30 DIAGNOSIS — I85.01 BLEEDING ESOPHAGEAL VARICES, UNSPECIFIED ESOPHAGEAL VARICES TYPE (HCC): ICD-10-CM

## 2021-12-30 LAB
ALBUMIN SERPL-MCNC: 2.8 G/DL (ref 3.4–5)
ALBUMIN/GLOB SERPL: 0.5 {RATIO} (ref 1–2)
ALP LIVER SERPL-CCNC: 260 U/L
ALT SERPL-CCNC: 39 U/L
ANION GAP SERPL CALC-SCNC: 4 MMOL/L (ref 0–18)
ANTIBODY SCREEN: NEGATIVE
APTT PPP: 36.9 SECONDS (ref 23.3–35.6)
AST SERPL-CCNC: 43 U/L (ref 15–37)
BASOPHILS # BLD AUTO: 0.01 X10(3) UL (ref 0–0.2)
BASOPHILS NFR BLD AUTO: 0.2 %
BILIRUB SERPL-MCNC: 1 MG/DL (ref 0.1–2)
BUN BLD-MCNC: 11 MG/DL (ref 7–18)
CALCIUM BLD-MCNC: 8.7 MG/DL (ref 8.5–10.1)
CHLORIDE SERPL-SCNC: 103 MMOL/L (ref 98–112)
CO2 SERPL-SCNC: 28 MMOL/L (ref 21–32)
CREAT BLD-MCNC: 0.84 MG/DL
EOSINOPHIL # BLD AUTO: 0.23 X10(3) UL (ref 0–0.7)
EOSINOPHIL NFR BLD AUTO: 4.1 %
ERYTHROCYTE [DISTWIDTH] IN BLOOD BY AUTOMATED COUNT: 14.8 %
GLOBULIN PLAS-MCNC: 5.9 G/DL (ref 2.8–4.4)
GLUCOSE BLD-MCNC: 151 MG/DL (ref 70–99)
HCT VFR BLD AUTO: 29.6 %
HGB BLD-MCNC: 9.9 G/DL
IMM GRANULOCYTES # BLD AUTO: 0.01 X10(3) UL (ref 0–1)
IMM GRANULOCYTES NFR BLD: 0.2 %
INR BLD: 1.31 (ref 0.8–1.2)
LYMPHOCYTES # BLD AUTO: 0.77 X10(3) UL (ref 1–4)
LYMPHOCYTES NFR BLD AUTO: 13.8 %
MCH RBC QN AUTO: 27.1 PG (ref 26–34)
MCHC RBC AUTO-ENTMCNC: 33.4 G/DL (ref 31–37)
MCV RBC AUTO: 81.1 FL
MONOCYTES # BLD AUTO: 0.78 X10(3) UL (ref 0.1–1)
MONOCYTES NFR BLD AUTO: 14 %
NEUTROPHILS # BLD AUTO: 3.79 X10 (3) UL (ref 1.5–7.7)
NEUTROPHILS # BLD AUTO: 3.79 X10(3) UL (ref 1.5–7.7)
NEUTROPHILS NFR BLD AUTO: 67.7 %
OSMOLALITY SERPL CALC.SUM OF ELEC: 282 MOSM/KG (ref 275–295)
PLATELET # BLD AUTO: 140 10(3)UL (ref 150–450)
POTASSIUM SERPL-SCNC: 3.8 MMOL/L (ref 3.5–5.1)
PROCALCITONIN SERPL-MCNC: 0.36 NG/ML (ref ?–0.16)
PROT SERPL-MCNC: 8.7 G/DL (ref 6.4–8.2)
PROTHROMBIN TIME: 16.3 SECONDS (ref 11.6–14.8)
RBC # BLD AUTO: 3.65 X10(6)UL
RH BLOOD TYPE: POSITIVE
SARS-COV-2 RNA RESP QL NAA+PROBE: NOT DETECTED
SODIUM SERPL-SCNC: 135 MMOL/L (ref 136–145)
WBC # BLD AUTO: 5.6 X10(3) UL (ref 4–11)

## 2021-12-30 PROCEDURE — 99223 1ST HOSP IP/OBS HIGH 75: CPT | Performed by: HOSPITALIST

## 2021-12-30 PROCEDURE — 99291 CRITICAL CARE FIRST HOUR: CPT | Performed by: NURSE PRACTITIONER

## 2021-12-30 RX ORDER — POTASSIUM CHLORIDE 14.9 MG/ML
20 INJECTION INTRAVENOUS ONCE
Status: COMPLETED | OUTPATIENT
Start: 2021-12-30 | End: 2021-12-30

## 2021-12-30 RX ORDER — PROCHLORPERAZINE EDISYLATE 5 MG/ML
5 INJECTION INTRAMUSCULAR; INTRAVENOUS EVERY 8 HOURS PRN
Status: DISCONTINUED | OUTPATIENT
Start: 2021-12-30 | End: 2021-12-30

## 2021-12-30 RX ORDER — SODIUM CHLORIDE 9 MG/ML
INJECTION, SOLUTION INTRAVENOUS CONTINUOUS
Status: DISCONTINUED | OUTPATIENT
Start: 2021-12-30 | End: 2021-12-31

## 2021-12-30 RX ORDER — ONDANSETRON 2 MG/ML
4 INJECTION INTRAMUSCULAR; INTRAVENOUS EVERY 6 HOURS PRN
Status: DISCONTINUED | OUTPATIENT
Start: 2021-12-30 | End: 2022-01-02

## 2021-12-30 RX ORDER — PROCHLORPERAZINE EDISYLATE 5 MG/ML
5 INJECTION INTRAMUSCULAR; INTRAVENOUS EVERY 8 HOURS PRN
Status: DISCONTINUED | OUTPATIENT
Start: 2021-12-30 | End: 2022-01-02

## 2021-12-30 RX ORDER — ROSUVASTATIN CALCIUM 20 MG/1
20 TABLET, COATED ORAL NIGHTLY
Status: DISCONTINUED | OUTPATIENT
Start: 2021-12-30 | End: 2022-01-02

## 2021-12-30 RX ORDER — PANTOPRAZOLE SODIUM 40 MG/1
40 TABLET, DELAYED RELEASE ORAL
Status: DISCONTINUED | OUTPATIENT
Start: 2021-12-31 | End: 2021-12-30

## 2021-12-30 RX ORDER — ACETAMINOPHEN 325 MG/1
650 TABLET ORAL EVERY 6 HOURS PRN
Status: DISCONTINUED | OUTPATIENT
Start: 2021-12-30 | End: 2022-01-02

## 2021-12-30 RX ORDER — ONDANSETRON 2 MG/ML
4 INJECTION INTRAMUSCULAR; INTRAVENOUS EVERY 6 HOURS PRN
Status: DISCONTINUED | OUTPATIENT
Start: 2021-12-30 | End: 2021-12-30

## 2021-12-30 RX ORDER — PROPRANOLOL HYDROCHLORIDE 10 MG/1
10 TABLET ORAL 2 TIMES DAILY
Status: DISCONTINUED | OUTPATIENT
Start: 2021-12-31 | End: 2021-12-31

## 2021-12-30 RX ORDER — CETIRIZINE HYDROCHLORIDE 10 MG/1
10 TABLET ORAL NIGHTLY
Status: DISCONTINUED | OUTPATIENT
Start: 2021-12-30 | End: 2022-01-02

## 2021-12-30 RX ORDER — MELATONIN
3 NIGHTLY PRN
Status: DISCONTINUED | OUTPATIENT
Start: 2021-12-30 | End: 2022-01-02

## 2021-12-30 RX ORDER — SODIUM CHLORIDE 9 MG/ML
125 INJECTION, SOLUTION INTRAVENOUS CONTINUOUS
Status: DISCONTINUED | OUTPATIENT
Start: 2021-12-30 | End: 2021-12-30

## 2021-12-30 RX ORDER — DEXTROSE MONOHYDRATE 25 G/50ML
50 INJECTION, SOLUTION INTRAVENOUS
Status: DISCONTINUED | OUTPATIENT
Start: 2021-12-30 | End: 2022-01-02

## 2021-12-30 RX ORDER — SODIUM CHLORIDE 9 MG/ML
INJECTION, SOLUTION INTRAVENOUS CONTINUOUS
Status: DISCONTINUED | OUTPATIENT
Start: 2021-12-30 | End: 2021-12-30

## 2021-12-30 NOTE — ED INITIAL ASSESSMENT (HPI)
Pt reports hx of cirrhosis and esophageal varices. Pt reports he just had a banding done on the 15th of this month. Pt reports today he had 1 episode of emesis, pt reports it was bright red blood. Pt reports having nausea, denies any pain.

## 2021-12-30 NOTE — ED PROVIDER NOTES
Patient Seen in: [facility] Emergency Department      History   Patient presents with:  GI Bleeding    Stated Complaint: hx of esophageal varices, vomited blood    Subjective:   HPI    Patient was admitted last month.   Patient with a history of cirr tobacco: Never Used      Tobacco comment: once a week, quit 3 months ago    Vaping Use      Vaping Use: Never used    Alcohol use: Not Currently      Comment: quit 3 months ago    Drug use:  No             Review of Systems    Positive for stated complaint: within normal limits   PTT, ACTIVATED - Abnormal; Notable for the following components:    PTT 36.9 (*)     All other components within normal limits   CBC W/ DIFFERENTIAL - Abnormal; Notable for the following components:    RBC 3.65 (*)     HGB 9.9 (*) consult  Admission disposition: 12/30/2021  6:46 PM           CBC shows hemoglobin 9.9 which is improved from previous. Platelets 890  Metabolic panel shows mild hyponatremia.   Creatinine normal.  Mild elevation of AST  Coags shows INR 1.3  Rapid Covid ne

## 2021-12-31 ENCOUNTER — ANESTHESIA (OUTPATIENT)
Dept: ENDOSCOPY | Facility: HOSPITAL | Age: 56
DRG: 377 | End: 2021-12-31
Payer: COMMERCIAL

## 2021-12-31 ENCOUNTER — ANESTHESIA EVENT (OUTPATIENT)
Dept: ENDOSCOPY | Facility: HOSPITAL | Age: 56
DRG: 377 | End: 2021-12-31
Payer: COMMERCIAL

## 2021-12-31 LAB
ALBUMIN SERPL-MCNC: 2.7 G/DL (ref 3.4–5)
ALBUMIN/GLOB SERPL: 0.5 {RATIO} (ref 1–2)
ALP LIVER SERPL-CCNC: 240 U/L
ALT SERPL-CCNC: 36 U/L
ANION GAP SERPL CALC-SCNC: 5 MMOL/L (ref 0–18)
AST SERPL-CCNC: 51 U/L (ref 15–37)
BILIRUB SERPL-MCNC: 1.2 MG/DL (ref 0.1–2)
BUN BLD-MCNC: 12 MG/DL (ref 7–18)
CALCIUM BLD-MCNC: 8.6 MG/DL (ref 8.5–10.1)
CHLORIDE SERPL-SCNC: 105 MMOL/L (ref 98–112)
CO2 SERPL-SCNC: 26 MMOL/L (ref 21–32)
CREAT BLD-MCNC: 0.73 MG/DL
ERYTHROCYTE [DISTWIDTH] IN BLOOD BY AUTOMATED COUNT: 14.8 %
ERYTHROCYTE [DISTWIDTH] IN BLOOD BY AUTOMATED COUNT: 15.4 %
GLOBULIN PLAS-MCNC: 5.5 G/DL (ref 2.8–4.4)
GLUCOSE BLD-MCNC: 89 MG/DL (ref 70–99)
GLUCOSE BLD-MCNC: 93 MG/DL (ref 70–99)
GLUCOSE BLD-MCNC: 95 MG/DL (ref 70–99)
GLUCOSE BLD-MCNC: 97 MG/DL (ref 70–99)
GLUCOSE BLD-MCNC: 97 MG/DL (ref 70–99)
HCT VFR BLD AUTO: 27.9 %
HCT VFR BLD AUTO: 33.6 %
HGB BLD-MCNC: 10.2 G/DL
HGB BLD-MCNC: 9.2 G/DL
HGB BLD-MCNC: 9.9 G/DL
INR BLD: 1.31 (ref 0.8–1.2)
INR BLD: 1.33 (ref 0.8–1.2)
MAGNESIUM SERPL-MCNC: 1.7 MG/DL (ref 1.6–2.6)
MCH RBC QN AUTO: 27 PG (ref 26–34)
MCH RBC QN AUTO: 27.3 PG (ref 26–34)
MCHC RBC AUTO-ENTMCNC: 30.4 G/DL (ref 31–37)
MCHC RBC AUTO-ENTMCNC: 33 G/DL (ref 31–37)
MCV RBC AUTO: 82.8 FL
MCV RBC AUTO: 88.9 FL
OSMOLALITY SERPL CALC.SUM OF ELEC: 281 MOSM/KG (ref 275–295)
PHOSPHATE SERPL-MCNC: 3.2 MG/DL (ref 2.5–4.9)
PLATELET # BLD AUTO: 118 10(3)UL (ref 150–450)
PLATELET # BLD AUTO: 130 10(3)UL (ref 150–450)
POTASSIUM SERPL-SCNC: 4 MMOL/L (ref 3.5–5.1)
POTASSIUM SERPL-SCNC: 4 MMOL/L (ref 3.5–5.1)
PROT SERPL-MCNC: 8.2 G/DL (ref 6.4–8.2)
PROTHROMBIN TIME: 16.3 SECONDS (ref 11.6–14.8)
PROTHROMBIN TIME: 16.5 SECONDS (ref 11.6–14.8)
RBC # BLD AUTO: 3.37 X10(6)UL
RBC # BLD AUTO: 3.78 X10(6)UL
SODIUM SERPL-SCNC: 136 MMOL/L (ref 136–145)
WBC # BLD AUTO: 4.6 X10(3) UL (ref 4–11)
WBC # BLD AUTO: 5.3 X10(3) UL (ref 4–11)

## 2021-12-31 PROCEDURE — 99232 SBSQ HOSP IP/OBS MODERATE 35: CPT | Performed by: HOSPITALIST

## 2021-12-31 PROCEDURE — 06L38CZ OCCLUSION OF ESOPHAGEAL VEIN WITH EXTRALUMINAL DEVICE, VIA NATURAL OR ARTIFICIAL OPENING ENDOSCOPIC: ICD-10-PCS | Performed by: INTERNAL MEDICINE

## 2021-12-31 RX ORDER — SODIUM CHLORIDE, SODIUM LACTATE, POTASSIUM CHLORIDE, CALCIUM CHLORIDE 600; 310; 30; 20 MG/100ML; MG/100ML; MG/100ML; MG/100ML
INJECTION, SOLUTION INTRAVENOUS CONTINUOUS
Status: DISCONTINUED | OUTPATIENT
Start: 2021-12-31 | End: 2022-01-01

## 2021-12-31 RX ORDER — ACETAMINOPHEN AND CODEINE PHOSPHATE 300; 30 MG/1; MG/1
1 TABLET ORAL EVERY 6 HOURS PRN
COMMUNITY

## 2021-12-31 RX ORDER — KETAMINE HYDROCHLORIDE 50 MG/ML
INJECTION, SOLUTION, CONCENTRATE INTRAMUSCULAR; INTRAVENOUS AS NEEDED
Status: DISCONTINUED | OUTPATIENT
Start: 2021-12-31 | End: 2021-12-31 | Stop reason: SURG

## 2021-12-31 RX ORDER — NALOXONE HYDROCHLORIDE 0.4 MG/ML
80 INJECTION, SOLUTION INTRAMUSCULAR; INTRAVENOUS; SUBCUTANEOUS AS NEEDED
Status: DISCONTINUED | OUTPATIENT
Start: 2021-12-31 | End: 2021-12-31 | Stop reason: HOSPADM

## 2021-12-31 RX ORDER — LIDOCAINE HYDROCHLORIDE 10 MG/ML
INJECTION, SOLUTION EPIDURAL; INFILTRATION; INTRACAUDAL; PERINEURAL AS NEEDED
Status: DISCONTINUED | OUTPATIENT
Start: 2021-12-31 | End: 2021-12-31 | Stop reason: SURG

## 2021-12-31 RX ORDER — ACETAMINOPHEN AND CODEINE PHOSPHATE 300; 30 MG/1; MG/1
1 TABLET ORAL EVERY 6 HOURS PRN
Status: DISCONTINUED | OUTPATIENT
Start: 2021-12-31 | End: 2022-01-02

## 2021-12-31 RX ORDER — SODIUM CHLORIDE 9 MG/ML
INJECTION, SOLUTION INTRAVENOUS CONTINUOUS
Status: DISCONTINUED | OUTPATIENT
Start: 2021-12-31 | End: 2022-01-01

## 2021-12-31 RX ADMIN — KETAMINE HYDROCHLORIDE 20 MG: 50 INJECTION, SOLUTION, CONCENTRATE INTRAMUSCULAR; INTRAVENOUS at 13:17:00

## 2021-12-31 RX ADMIN — LIDOCAINE HYDROCHLORIDE 50 MG: 10 INJECTION, SOLUTION EPIDURAL; INFILTRATION; INTRACAUDAL; PERINEURAL at 13:17:00

## 2021-12-31 NOTE — ANESTHESIA POSTPROCEDURE EVALUATION
[address] [place] [address] Patient Status:  Inpatient   Age/Gender 61year old male MRN [id number]   Location [address] [place] [address] Attending Taye Mckeon, [facility] Day # 1 PCP Marcellus Javed MD       Anesthesia Post-op Note

## 2021-12-31 NOTE — PROGRESS NOTES
[facility] Pharmacy Note: Antimicrobial Weight Based Dose Adjustment for: ceftriaxone (ROCEPHIN)    Melissa Wong is a 61year old patient who has been prescribed ceftriaxone (ROCEPHIN) 1000 mg every 24 hours.     Estimated Creatinine Clearance: 115.3 mL/min

## 2021-12-31 NOTE — OPERATIVE REPORT
Melissa Wong Patient Status:  Inpatient    1965 MRN [id number]   Location [id number] [place] [address] Attending Nicolle Joel MD   Date 2021 PCP Jarod Parekh MD     PREOPERATIVE DIAGNOSIS/INDICATION: Cirrhosis, portal prophylaxis  - Continue IC status    Aranza Hallman MD  12/31/2021  1:50 PM

## 2021-12-31 NOTE — PROGRESS NOTES
[facility]     Hospitalist Progress Note     Anil Wood Patient Status:  Inpatient    1965 MRN [id number]   [facility] 4SW-A Attending Myla Omalley MD   Hosp Day # 1 PCP Jamey Watkins MD     Chief Complaint: Dajuan Linear results for input(s): TROP, PBNP in the last 168 hours. Creatinine Kinase  No results for input(s): CK in the last 168 hours. Inflammatory Markers  No results for input(s): CRP, COLEEN, LDH, DDIMER in the last 168 hours.     Imaging: Imaging data reviewe no

## 2021-12-31 NOTE — ANESTHESIA PREPROCEDURE EVALUATION
PRE-OP EVALUATION    Patient Name: Sanju Campos [facility]    Admit Diagnosis:  Thrombocytopenia ([address] [D69.6]  Abnormal INR [R79.1]  Other cirrhosis of liver (HCC) [K74.69]  Hematemesis without nausea [K92.0]  Bleeding esophageal varices, unspecified esophagea Oral, Nightly  spironolactone (ALDACTONE) tab 150 mg, 150 mg, Oral, Daily  cetirizine (ZYRTEC) tab 10 mg, 10 mg, Oral, Nightly  acetaminophen (TYLENOL) tab 650 mg, 650 mg, Oral, Q6H PRN  melatonin tab 3 mg, 3 mg, Oral, Nightly PRN  Insulin Aspart Pen (HILDA summary reviewed.     Anesthetic Complications  (+) history of anesthetic complications  History of: difficult airway       GI/Hepatic/Renal      (+) GERD          (+) liver disease (cirrhosis / varices )                 Cardiovascular        Exercise taylor 12/31/2021    RDW 15.4 12/31/2021    .0 (L) 12/31/2021     Lab Results   Component Value Date     12/31/2021    K 4.0 12/31/2021    K 4.0 12/31/2021     12/31/2021    CO2 26.0 12/31/2021    BUN 12 12/31/2021    CREATSERUM 0.73 12/31/2021

## 2021-12-31 NOTE — CONSULTS
[address] [place] [address] Patient Status:  Inpatient    1965 MRN [id number]   [facility] 4SW-A Attending Deangelo Mao MD   Hosp Day # 1 PCP Abiel Mike MD     Date of Admission: 2021  Admission Diagnosis:  Thr Vicodin [Hydrocodon*    ANXIETY     Social History:   reports that he has quit smoking. His smoking use included cigars. He has never used smokeless tobacco. He reports previous alcohol use. He reports that he does not use drugs.         Family History: prochlorperazine (COMPAZINE) injection 5 mg, 5 mg, Intravenous, Q8H PRN  •  glucose (DEX4) oral liquid 15 g, 15 g, Oral, Q15 Min PRN **OR** glucose-vitamin C (DEX-4) chewable tab 4 tablet, 4 tablet, Oral, Q15 Min PRN **OR** dextrose 50 % injection 50 mL, 5 bilaterally  Chest wall: no tenderness  Heart: S1, S2 normal, no murmur, click, rub or gallop, regular rate and rhythm  Abdomen: soft, non-tender; bowel sounds normal; no masses,  no organomegaly  Extremities: extremities normal, atraumatic, no cyanosis or

## 2021-12-31 NOTE — H&P
[facility]  History and Physical     Jemma Olsen Patient Status:  Inpatient    1965 MRN [id number]   [facility] 4SW-A Attending Bib Sky MD   Hosp Day # 0 PCP Abiel Mike MD     Chief Complaint: [facility] drugs.   Family History:   Family History   Problem Relation Age of Onset   • Heart Disorder Father    • Heart Disease Father    • Breast Cancer Mother    • Diabetes Mother      Allergies:   Vicodin [Hydrocodon*    ANXIETY  Medications:  No current facility bowel sounds. No rebound or guarding. Neurologic: CNII-XII grossly intact. No focal neurological deficits. Musculoskeletal: Moves all extremities. Extremities: No edema. No cyanosis. Integument: No rashes or lesions.    Psychiatric: Appropriate mood an

## 2021-12-31 NOTE — PLAN OF CARE
Rec'd report from ER RN, pt transferred to ICU bed via stretcher, on monitor, with RN transport. Care assumed at 2040, pt assessed per flow sheets. Pt awake, alert, oriented x4, moves all extremities.   Pt remains on room air, sats greater than 92%, lungs

## 2021-12-31 NOTE — PROGRESS NOTES
12/31/21 1022   Clinical Encounter Type   Visited With Patient   Routine Visit Introduction   Continue Visiting No   Crisis Visit   (HCPOA)   Visited with patient to see if he would like to complete a POA for [facility]..  Patient denied to complete a HCPOA at th

## 2021-12-31 NOTE — ED QUICK NOTES
Orders for admission, patient is aware of plan and ready to go upstairs. Any questions, please call ED RN Mary at extension [phone].      Patient Covid vaccination status: Fully vaccinated     COVID Test Ordered in ED: Rapid SARS-CoV-2 by PCR    COVID Suspi

## 2021-12-31 NOTE — CONSULTS
[facility]  Report of Consultation    Maren Moffett Patient Status:  Inpatient    1965 MRN [id number]   [facility] 4SW-A Attending Noreen Munoz MD   Date of Consult 2021 PCP Moon Moon MD     Reason for [facility] smoking. His smoking use included cigars. He has never used smokeless tobacco. He reports previous alcohol use. He reports that he does not use drugs.     Allergies:    Vicodin [Hydrocodon*    ANXIETY    Medications:    Current Facility-Administered Medicat systems is negative. Physical Exam:    Blood pressure 122/69, pulse 55, temperature 96.9 °F (36.1 °C), temperature source Temporal, resp. rate 17, height 5' 10\" (1.778 m), weight 231 lb 14.8 oz (105.2 kg), SpO2 96 %.     Constitutional: Appearance: well reflect malignant adenopathy or inflammatory adenopathy.       5. Liquid stool throughout the colon consistent with diarrhea status.  Ascites all 4 quadrants small-moderate degree.  Nonspecific stranding of the abdominal fat could reflect metastatic infilt

## 2021-12-31 NOTE — PROGRESS NOTES
ICU  Critical Care APRN Progress Note    NAME: Maren Moffett - ROOM: [address] [place] [address] - MRN: [id number] - Age: 61year old - :1965    History Of Present Illness:  Maren Moffett is a 61year old male with PMHx significant for anxiety, diabetes, 18   Ht 177.8 cm (5' 10\")   Wt 231 lb 14.8 oz (105.2 kg)   SpO2 98%   BMI 33.28 kg/m²                  Physical Exam:    General Appearance: Alert, cooperative, no distress, appears stated age  Neck: No JVD, neck supple, no adenopathy, trachea midline, no [phone]        A total of 35 minutes of critical care time (exclusive of billable procedures) was administered.  This involved direct patient intervention, complex decision making, and/or extensive discussions (>50% face to face time) with the patient, famil

## 2022-01-01 LAB
ANION GAP SERPL CALC-SCNC: 4 MMOL/L (ref 0–18)
BASOPHILS # BLD AUTO: 0.01 X10(3) UL (ref 0–0.2)
BASOPHILS NFR BLD AUTO: 0.2 %
BUN BLD-MCNC: 12 MG/DL (ref 7–18)
CALCIUM BLD-MCNC: 8.2 MG/DL (ref 8.5–10.1)
CHLORIDE SERPL-SCNC: 105 MMOL/L (ref 98–112)
CO2 SERPL-SCNC: 27 MMOL/L (ref 21–32)
CREAT BLD-MCNC: 0.73 MG/DL
EOSINOPHIL # BLD AUTO: 0.23 X10(3) UL (ref 0–0.7)
EOSINOPHIL NFR BLD AUTO: 4.6 %
ERYTHROCYTE [DISTWIDTH] IN BLOOD BY AUTOMATED COUNT: 14.6 %
GLUCOSE BLD-MCNC: 115 MG/DL (ref 70–99)
GLUCOSE BLD-MCNC: 153 MG/DL (ref 70–99)
GLUCOSE BLD-MCNC: 208 MG/DL (ref 70–99)
GLUCOSE BLD-MCNC: 89 MG/DL (ref 70–99)
GLUCOSE BLD-MCNC: 93 MG/DL (ref 70–99)
GLUCOSE BLD-MCNC: 96 MG/DL (ref 70–99)
HCT VFR BLD AUTO: 28.8 %
HGB BLD-MCNC: 9.2 G/DL
IMM GRANULOCYTES # BLD AUTO: 0.02 X10(3) UL (ref 0–1)
IMM GRANULOCYTES NFR BLD: 0.4 %
INR BLD: 1.28 (ref 0.8–1.2)
LYMPHOCYTES # BLD AUTO: 0.77 X10(3) UL (ref 1–4)
LYMPHOCYTES NFR BLD AUTO: 15.5 %
MCH RBC QN AUTO: 26 PG (ref 26–34)
MCHC RBC AUTO-ENTMCNC: 31.9 G/DL (ref 31–37)
MCV RBC AUTO: 81.4 FL
MONOCYTES # BLD AUTO: 0.62 X10(3) UL (ref 0.1–1)
MONOCYTES NFR BLD AUTO: 12.5 %
NEUTROPHILS # BLD AUTO: 3.32 X10 (3) UL (ref 1.5–7.7)
NEUTROPHILS # BLD AUTO: 3.32 X10(3) UL (ref 1.5–7.7)
NEUTROPHILS NFR BLD AUTO: 66.8 %
OSMOLALITY SERPL CALC.SUM OF ELEC: 281 MOSM/KG (ref 275–295)
PLATELET # BLD AUTO: 131 10(3)UL (ref 150–450)
POTASSIUM SERPL-SCNC: 4.1 MMOL/L (ref 3.5–5.1)
PROTHROMBIN TIME: 16.1 SECONDS (ref 11.6–14.8)
RBC # BLD AUTO: 3.54 X10(6)UL
SODIUM SERPL-SCNC: 136 MMOL/L (ref 136–145)
WBC # BLD AUTO: 5 X10(3) UL (ref 4–11)

## 2022-01-01 PROCEDURE — 99232 SBSQ HOSP IP/OBS MODERATE 35: CPT | Performed by: HOSPITALIST

## 2022-01-01 RX ORDER — ALPRAZOLAM 0.5 MG/1
0.5 TABLET ORAL 3 TIMES DAILY PRN
Status: DISCONTINUED | OUTPATIENT
Start: 2022-01-01 | End: 2022-01-02

## 2022-01-01 RX ORDER — PANTOPRAZOLE SODIUM 40 MG/1
40 TABLET, DELAYED RELEASE ORAL
Status: DISCONTINUED | OUTPATIENT
Start: 2022-01-02 | End: 2022-01-02

## 2022-01-01 RX ORDER — CARVEDILOL 6.25 MG/1
6.25 TABLET ORAL 2 TIMES DAILY WITH MEALS
Status: DISCONTINUED | OUTPATIENT
Start: 2022-01-01 | End: 2022-01-02

## 2022-01-01 NOTE — PROGRESS NOTES
[facility]  Progress Note    Ant Roman Patient Status:  Inpatient    1965 MRN [id number]   [facility] 4SW-A Attending Anamika Lara MD   Date 2022 PCP Crystal Hernandez MD     Subjective:   Ant Roman is a Cirrhosis  Thrombocytopenia  Coagulopathy  Abnormal LFT's ALP/AST  Ascites high SAAG no h/o SBP     Plan;    IV protonix q 12 hrs 40 mg switch to PO  IV Octreaotide drip discontinue tomorrow  Prophylactic antibiotics to complete 7 days  Advance diet to s

## 2022-01-01 NOTE — PLAN OF CARE
Assumed care of patient at change of shift. Pt aox4. Afebrile, SR/SB on tele, sbp stable, room air. C/o pain (chronic in nature) to back exacerbated by hospital beds, prn tylenol #3 x2 with relief.  Repeat hgb this am 10.2 from 9.2. to GI lab for egd, see p

## 2022-01-01 NOTE — PROGRESS NOTES
[facility]     Hospitalist Progress Note     Markus Brown Patient Status:  Inpatient    1965 MRN [id number]   [facility] 4SW-A Attending Art Crespo MD   Hosp Day # 2 PCP Edgardo Silva MD     Chief Complaint: Mary Mendez Detected 11/12/2021    COVID19 Not Detected 11/07/2021       Pro-Calcitonin  Recent Labs   Lab 12/30/21  1808   PCT 0.36*       Cardiac  No results for input(s): TROP, PBNP in the last 168 hours.     Creatinine Kinase  No results for input(s): CK in the las

## 2022-01-01 NOTE — PLAN OF CARE
Patient resting in bed. A&O x4. On room air with O2 saturations >90. NS/SB. Hemodynamically stable, afebrile. NPO, complains of hunger and nausea, zofran given with improvement. No signs of active bleeding.  States he has back pain which is chronic, decline

## 2022-01-01 NOTE — PROGRESS NOTES
[address] [place] [address] Patient Status:  Inpatient    1965 MRN [id number]   [facility] 4SW-A Attending Talia Montes MD   [address] Day # 2 PCP Carla Bean MD     Critical Care Progress Note     Date of Admission: 12/3 Daily  •  rosuvastatin (CRESTOR) tab 20 mg, 20 mg, Oral, Nightly  •  spironolactone (ALDACTONE) tab 150 mg, 150 mg, Oral, Daily  •  cetirizine (ZYRTEC) tab 10 mg, 10 mg, Oral, Nightly  •  acetaminophen (TYLENOL) tab 650 mg, 650 mg, Oral, Q6H PRN  •  [place] banding performed  - no signs of active bleeding  - PPI, octreotide gtt's ongoing  - GI following  - serial Hgb  2.  Anemia- acute on chronic- secondary to above  - monitor and tx prn for Hbg <7 or active bleeding  - hgb down one gram from yesterday; hemodi

## 2022-01-01 NOTE — PLAN OF CARE
Problem: Patient/Family Goals  Goal: Patient/Family Long Term Goal  Description: Patient's Long Term Goal: \"go home\"    Interventions:    - See additional Care Plan goals for specific interventions  Outcome: Progressing  Goal: Patient/Family Short Term

## 2022-01-02 VITALS
OXYGEN SATURATION: 93 % | WEIGHT: 231.94 LBS | RESPIRATION RATE: 18 BRPM | HEART RATE: 54 BPM | SYSTOLIC BLOOD PRESSURE: 114 MMHG | BODY MASS INDEX: 33.21 KG/M2 | HEIGHT: 70 IN | TEMPERATURE: 98 F | DIASTOLIC BLOOD PRESSURE: 63 MMHG

## 2022-01-02 LAB
GLUCOSE BLD-MCNC: 107 MG/DL (ref 70–99)
GLUCOSE BLD-MCNC: 167 MG/DL (ref 70–99)
INR BLD: 1.29 (ref 0.8–1.2)
PROTHROMBIN TIME: 16.1 SECONDS (ref 11.6–14.8)

## 2022-01-02 PROCEDURE — 99239 HOSP IP/OBS DSCHRG MGMT >30: CPT | Performed by: INTERNAL MEDICINE

## 2022-01-02 RX ORDER — LEVOFLOXACIN 500 MG/1
500 TABLET, FILM COATED ORAL DAILY
Status: DISCONTINUED | OUTPATIENT
Start: 2022-01-03 | End: 2022-01-02

## 2022-01-02 RX ORDER — LEVOFLOXACIN 500 MG/1
500 TABLET, FILM COATED ORAL DAILY
Qty: 4 TABLET | Refills: 0 | Status: SHIPPED | OUTPATIENT
Start: 2022-01-03

## 2022-01-02 RX ORDER — PANTOPRAZOLE SODIUM 40 MG/1
40 TABLET, DELAYED RELEASE ORAL
Qty: 30 TABLET | Refills: 0 | Status: SHIPPED | OUTPATIENT
Start: 2022-01-02 | End: 2022-01-02

## 2022-01-02 NOTE — PROGRESS NOTES
[facility]     Hospitalist Progress Note     Leandro Patterson Patient Status:  Inpatient    1965 MRN [id number]   [facility] 4SW-A Attending Marti Mcneil MD   Hosp Day # 3 PCP Debra Esposito MD     Chief Complaint: Cameron Villafana 12/30/2021    COVID19 Not Detected 11/12/2021    COVID19 Not Detected 11/07/2021     Pro-Calcitonin  Recent Labs   Lab 12/30/21  1808   PCT 0.36*     Cardiac  No results for input(s): TROP, PBNP in the last 168 hours.     Creatinine Kinase  No results for i

## 2022-01-02 NOTE — PROGRESS NOTES
Dc home today in stable condition,Saline lock removed,Dc instruction and priscription given. Instructed on follow up appt w/ his GI  As outpatient,Verbalized needs and understanding.

## 2022-01-02 NOTE — PROGRESS NOTES
Patient arrived on unit around 2200 1/1/22. Awake and alert  RA  On tele. SB  Gait steady  No reported vomiting  Patient c/o L hip pain (not new). Tylenol#3 given. Octriotide gtt at 50mcg/hr  Fluids discontinued.

## 2022-01-02 NOTE — PLAN OF CARE
Assumed care of patient at change of shift. Pt aox4. Afebrile, room air sats >90%, sb on tele, hemodynamically stable. C/o mild pain (chronic) back declined pain medication, using massage gun at bedside. No signs of ongoing bleeding.  Started clear liquid d

## 2022-01-03 NOTE — DISCHARGE SUMMARY
[facility] HOSPITALIST  DISCHARGE SUMMARY     Shu Petty Patient Status:  Inpatient    1965 MRN [id number]   [facility] 3NE-A Attending No att. providers found   Hosp Day # 3 PCP Chandu Whitney MD     Date of Admission: 20 Tabs  Commonly known as: LEVAQUIN  Start taking on: January 3, 2022      Take 1 tablet (500 mg total) by mouth daily.    Quantity: 4 tablet  Refills: 0        CONTINUE taking these medications      Instructions Prescription details   acetaminophen-codeine 3 controlled substances prescribed at discharge. Follow-up appointment:   No follow-up provider specified.   Appointments for Next 30 Days 1/2/2022 - 2/1/2022            None        --------------------------------------------------------------------------

## 2022-01-04 NOTE — PAYOR COMM NOTE
--------------  DISCHARGE REVIEW    Payor: Delgadobaldemar Scott #:  [id number]  Authorization Number: [id number]    Admit date: 12/30/21  Admit time:   8:32 PM  Discharge Date: 1/2/2022  1:59 PM     Admitting Physician: Phyllis Duffy MD  Attending Date of Admission: 12/30/2021  Date of Discharge: 1/2/2022  Discharge Disposition: Home or Self Care    Discharge Diagnosis:   Hematemesis due to variceal bleeding s/p EGD with 5 bands  Cirrhosis  Portal hypertension  HORAN  Thrombocytopenia  Anemia  Dy acetaminophen-codeine 300-30 MG Tabs  Commonly known as: TYLENOL #3      Take 1 tablet by mouth every 6 (six) hours as needed for Pain (1-2 tablets q 6 hours - pain).    Refills: 0     ALPRAZolam 0.5 MG Tabs  Commonly known as: XANAX      Take 0.5 mg by m -----------------------------------------------------------------------------------------------  PATIENT DISCHARGE INSTRUCTIONS: See electronic chart    Can Castro DO    Time spent:  > 30 minutes    Electronically signed by Priyank Kimball DO on 1/2/ unspecified hyperlipidemia    • Problems with swallowing    • Pure hyperglyceridemia    • Unspecified sleep apnea    • Visual impairment               Past Surgical History:   Procedure Laterality Date   • ADENOIDECTOMY     • CATARACT     • CHOLECYSTECTOMY with ascites but soft and completely nontender, even with deep palpation. Extremities: Unremarkable. Calves nonswollen, symmetric, nontender. Trace ankle edema  Skin: Slightly jaundiced  Neurologic:  Mental status as above.   Patient moves all extremit LCX)[[id number]]                               Final result               Antibody Screen[[id number]]                                  Final result                 Please view results for these tests on the individual orders.    ABORH (BLOOD TYPE)   ANTIBODY signed by James Altman MD at 12/30/2021  9:08 PM     Author: James Altman MD Service: Hospitalist Author Type: Physician    Filed: 12/30/2021  9:08 PM Date of Service: 12/30/2021  9:01 PM Status: Signed    : James Altman MD (Physician) GERD, ESOPHAGITIS, RPT 2 YRS   • UPPER GI ENDOSCOPY,EXAM       Social History:  reports that he has quit smoking. His smoking use included cigars. He has never used smokeless tobacco. He reports previous alcohol use.  He reports that he does not use drug effort. Clear to auscultation bilaterally. No rhonchi. Cardiovascular: S1, S2. Regular rhythm. Regular rate. No murmurs, rubs or gallops. Equal pulses. Chest and Back: No tenderness or deformity. Abdomen: Soft, nontender,+ distended.   Positive bowel s (36.9 °C) 55 — 111/63 93 % — None (Room air) — AD    01/02/22 0330 97.8 °F (36.6 °C) 57 18 116/60 93 % — None (Room air) — AR    01/02/22 0000 98.2 °F (36.8 °C) 55 18 111/53 97 % — None (Room air) — AR    01/01/22 2045 — 61 17 111/60 93 % — — — ML    01/01

## 2022-01-13 NOTE — PAYOR COMM NOTE
--------------  DISCHARGE REVIEW    Payor: Kendy Ying #:  [id number]  Authorization Number: [id number]    Admit date: 12/30/21  Admit time:   8:32 PM  Discharge Date: 1/2/2022  1:59 PM     Admitting Physician: Camron Tapia MD  Attending subsequently discharged home on a course of antibiotics. He will have therapeutic paracentesis as outpatient. Lace+ Score: 74  59-90 High Risk  29-58 Medium Risk  0-28   Low Risk         TCM Follow-Up Recommendation:  LACE > 58:  High Risk of readmissio for Nausea. Quantity: 20 tablet  Refills: 0     Pseudoephedrine HCl 60 MG Tabs  Commonly known as: SUDOGEST      Take 60 mg by mouth every 6 (six) hours as needed for congestion.    Refills: 0     rosuvastatin 20 MG Tabs  Commonly known as: CRESTOR      T

## (undated) DEVICE — 3M™ RED DOT™ MONITORING ELECTRODE WITH FOAM TAPE AND STICKY GEL, 50/BAG, 20/CASE, 72/PLT 2570: Brand: RED DOT™

## (undated) DEVICE — 1200CC GUARDIAN II: Brand: GUARDIAN

## (undated) DEVICE — SPEEDBAND SUPERVIEW SUPER 7

## (undated) DEVICE — ENDOSCOPY PACK UPPER: Brand: MEDLINE INDUSTRIES, INC.

## (undated) DEVICE — Device: Brand: DEFENDO AIR/WATER/SUCTION AND BIOPSY VALVE

## (undated) DEVICE — FILTERLINE NASAL ADULT O2/CO2

## (undated) NOTE — LETTER
Katiuska Valencia 89  [address] [facility] [address] [facility]  Authorization for Surgical Operation and Procedure     Date:___________                                                                                                         Time:__________ that can occur: fever and allergic reactions, hemolytic reactions, transmission of diseases such as Hepatitis, AIDS and Cytomegalovirus (CMV) and fluid overload.   In the event that I wish to have an autologous transfusion of my own blood, or a directed don when the applicable recovery period ends for purposes of reinstating the DNAR order.   10. Patients having a sterilization procedure: I understand that if the procedure is successful the results will be permanent and it will therefore be impossible for me t (anesthesia doctor) to give me medicine and do additional procedures as necessary.  Some examples are: Starting or using an “IV” to give me medicine, fluids or blood during my procedure, and having a breathing tube placed to help me breathe when I’m asleep blocks”): I understand that rare but potential complications include headache, bleeding, infection, seizure, irregular heart rhythms, and nerve injury.     I can change my mind about having anesthesia services at any time before I get the medicine.    ____

## (undated) NOTE — LETTER
Katiuska Valencia 89  [address] [facility] [address] [facility]  Authorization for Surgical Operation and Procedure     Date:___________                                                                                                         Time:__________ that can occur: fever and allergic reactions, hemolytic reactions, transmission of diseases such as Hepatitis, AIDS and Cytomegalovirus (CMV) and fluid overload.   In the event that I wish to have an autologous transfusion of my own blood, or a directed don when the applicable recovery period ends for purposes of reinstating the DNAR order.   10. Patients having a sterilization procedure: I understand that if the procedure is successful the results will be permanent and it will therefore be impossible for me t doctor) to give me medicine and do additional procedures as necessary.  Some examples are: Starting or using an “IV” to give me medicine, fluids or blood during my procedure, and having a breathing tube placed to help me breathe when I’m asleep (intubation) understand that rare but potential complications include headache, bleeding, infection, seizure, irregular heart rhythms, and nerve injury.     I can change my mind about having anesthesia services at any time before I get the medicine.    _________________

## (undated) NOTE — LETTER
Katiuska Valencia 89  [address] [facility] [address] [facility]  Authorization for Surgical Operation and Procedure     Date:___________                                                                                                         Time:__________ products. The following are some, but not all, of the potential risks that can occur: fever and allergic reactions, hemolytic reactions, transmission of diseases such as Hepatitis, AIDS and Cytomegalovirus (CMV) and fluid overload.   In the event that I wi on my behalf). The surgeon or my attending physician will determine when the applicable recovery period ends for purposes of reinstating the DNAR order.   10. Patients having a sterilization procedure: I understand that if the procedure is successful the re anesthesia services, I agree to:  a. Allow the anesthesiologist (anesthesia doctor) to give me medicine and do additional procedures as necessary.  Some examples are: Starting or using an “IV” to give me medicine, fluids or blood during my procedure, and ha injury. 7. Regional Anesthesia (“spinal”, “epidural”, & “nerve blocks”): I understand that rare but potential complications include headache, bleeding, infection, seizure, irregular heart rhythms, and nerve injury.     I can change my mind about having an